# Patient Record
Sex: MALE | HISPANIC OR LATINO | URBAN - METROPOLITAN AREA
[De-identification: names, ages, dates, MRNs, and addresses within clinical notes are randomized per-mention and may not be internally consistent; named-entity substitution may affect disease eponyms.]

---

## 2017-08-01 PROBLEM — F51.05 INSOMNIA DUE TO MENTAL CONDITION: Status: ACTIVE | Noted: 2017-08-01

## 2017-08-01 PROBLEM — F43.21 GRIEF REACTION: Status: ACTIVE | Noted: 2017-08-01

## 2017-09-01 PROBLEM — F43.21 GRIEF REACTION: Status: RESOLVED | Noted: 2017-08-01 | Resolved: 2017-09-01

## 2017-09-01 PROBLEM — F51.05 INSOMNIA DUE TO MENTAL CONDITION: Status: RESOLVED | Noted: 2017-08-01 | Resolved: 2017-09-01

## 2017-10-13 PROBLEM — F43.22 ADJUSTMENT DISORDER WITH ANXIETY: Status: ACTIVE | Noted: 2017-10-13

## 2017-11-10 ENCOUNTER — APPOINTMENT (RX ONLY)
Dept: URBAN - METROPOLITAN AREA CLINIC 349 | Facility: CLINIC | Age: 62
Setting detail: DERMATOLOGY
End: 2017-11-10

## 2017-11-10 DIAGNOSIS — L82.1 OTHER SEBORRHEIC KERATOSIS: ICD-10-CM

## 2017-11-10 DIAGNOSIS — L82.0 INFLAMED SEBORRHEIC KERATOSIS: ICD-10-CM

## 2017-11-10 PROBLEM — F32.9 MAJOR DEPRESSIVE DISORDER, SINGLE EPISODE, UNSPECIFIED: Status: ACTIVE | Noted: 2017-11-10

## 2017-11-10 PROBLEM — K21.9 GASTRO-ESOPHAGEAL REFLUX DISEASE WITHOUT ESOPHAGITIS: Status: ACTIVE | Noted: 2017-11-10

## 2017-11-10 PROBLEM — F41.9 ANXIETY DISORDER, UNSPECIFIED: Status: ACTIVE | Noted: 2017-11-10

## 2017-11-10 PROCEDURE — 17110 DESTRUCTION B9 LES UP TO 14: CPT

## 2017-11-10 PROCEDURE — 99202 OFFICE O/P NEW SF 15 MIN: CPT | Mod: 25

## 2017-11-10 PROCEDURE — ? COUNSELING

## 2017-11-10 PROCEDURE — ? LIQUID NITROGEN

## 2017-11-10 ASSESSMENT — LOCATION DETAILED DESCRIPTION DERM
LOCATION DETAILED: RIGHT CLAVICULAR SKIN
LOCATION DETAILED: RIGHT CENTRAL FRONTAL SCALP
LOCATION DETAILED: RIGHT LATERAL SUPERIOR CHEST
LOCATION DETAILED: RIGHT SUPERIOR FOREHEAD
LOCATION DETAILED: LEFT MID-UPPER BACK
LOCATION DETAILED: LEFT LATERAL SUPERIOR CHEST
LOCATION DETAILED: RIGHT SUPERIOR FOREHEAD

## 2017-11-10 ASSESSMENT — LOCATION SIMPLE DESCRIPTION DERM
LOCATION SIMPLE: RIGHT FOREHEAD
LOCATION SIMPLE: LEFT UPPER BACK
LOCATION SIMPLE: RIGHT SCALP
LOCATION SIMPLE: RIGHT CLAVICULAR SKIN
LOCATION SIMPLE: CHEST
LOCATION SIMPLE: RIGHT FOREHEAD

## 2017-11-10 ASSESSMENT — LOCATION ZONE DERM
LOCATION ZONE: FACE
LOCATION ZONE: TRUNK
LOCATION ZONE: TRUNK
LOCATION ZONE: SCALP
LOCATION ZONE: FACE

## 2017-11-10 NOTE — PROCEDURE: LIQUID NITROGEN
Add 52 Modifier (Optional): no
Consent: The patient's consent was obtained including but not limited to risks of crusting, scabbing, blistering, scarring, darker or lighter pigmentary change, recurrence, incomplete removal and infection.
Number Of Freeze-Thaw Cycles: 1 freeze-thaw cycle
Post-Care Instructions: I reviewed with the patient in detail post-care instructions. Patient is to wear sunprotection, and avoid picking at any of the treated lesions. Pt may apply Vaseline to crusted or scabbing areas.
Medical Necessity Clause: This procedure was medically necessary because the lesions that were treated were:
Include Z78.9 (Other Specified Conditions Influencing Health Status) As An Associated Diagnosis?: Yes
Detail Level: Detailed
Medical Necessity Information: It is in your best interest to select a reason for this procedure from the list below. All of these items fulfill various CMS LCD requirements except the new and changing color options.

## 2017-12-15 PROBLEM — F33.1 MODERATE EPISODE OF RECURRENT MAJOR DEPRESSIVE DISORDER (HCC): Status: ACTIVE | Noted: 2017-12-15

## 2018-08-01 ENCOUNTER — HOSPITAL ENCOUNTER (OUTPATIENT)
Dept: SLEEP MEDICINE | Age: 63
Discharge: HOME | End: 2018-08-01
Payer: MEDICARE

## 2018-08-01 PROCEDURE — 95810 POLYSOM 6/> YRS 4/> PARAM: CPT

## 2018-09-13 PROBLEM — E11.9 CONTROLLED TYPE 2 DIABETES MELLITUS WITHOUT COMPLICATION, WITHOUT LONG-TERM CURRENT USE OF INSULIN (HCC): Status: ACTIVE | Noted: 2018-09-13

## 2018-09-13 PROBLEM — E11.9 CONTROLLED TYPE 2 DIABETES MELLITUS WITHOUT COMPLICATION, WITHOUT LONG-TERM CURRENT USE OF INSULIN (HCC): Status: RESOLVED | Noted: 2018-09-13 | Resolved: 2018-09-13

## 2019-08-05 PROBLEM — R90.89 ABNORMAL FINDING ON MRI OF BRAIN: Status: ACTIVE | Noted: 2019-08-05

## 2019-08-05 PROBLEM — R73.03 PREDIABETES: Status: ACTIVE | Noted: 2019-08-05

## 2019-08-05 PROBLEM — E66.3 OVERWEIGHT: Status: ACTIVE | Noted: 2019-08-05

## 2019-08-05 PROBLEM — Z71.82 EXERCISE COUNSELING: Status: ACTIVE | Noted: 2019-08-05

## 2019-08-05 PROBLEM — Z72.0 TOBACCO USE: Status: ACTIVE | Noted: 2019-08-05

## 2019-08-05 PROBLEM — Z71.3 DIETARY COUNSELING: Status: ACTIVE | Noted: 2019-08-05

## 2019-11-05 PROBLEM — Z23 ENCOUNTER FOR IMMUNIZATION: Status: ACTIVE | Noted: 2019-11-05

## 2019-11-05 PROBLEM — G25.81 RLS (RESTLESS LEGS SYNDROME): Status: ACTIVE | Noted: 2019-11-05

## 2019-11-05 PROBLEM — M19.042 ARTHRITIS OF LEFT HAND: Status: ACTIVE | Noted: 2019-11-05

## 2019-11-05 PROBLEM — Z12.2 ENCOUNTER FOR SCREENING FOR LUNG CANCER: Status: ACTIVE | Noted: 2019-11-05

## 2019-11-05 PROBLEM — J32.9 SINUSITIS: Status: ACTIVE | Noted: 2019-11-05

## 2019-11-05 PROBLEM — M77.8 RIGHT ELBOW TENDINITIS: Status: ACTIVE | Noted: 2019-11-05

## 2019-11-05 PROBLEM — H66.92 LEFT OTITIS MEDIA: Status: ACTIVE | Noted: 2019-11-05

## 2019-11-11 PROBLEM — R10.33 ACUTE PERIUMBILICAL PAIN: Status: ACTIVE | Noted: 2019-11-11

## 2019-11-11 PROBLEM — R19.5 DARK STOOLS: Status: ACTIVE | Noted: 2019-11-11

## 2019-11-11 PROBLEM — R42 DIZZINESS: Status: ACTIVE | Noted: 2019-11-11

## 2019-11-11 PROBLEM — K62.5 RECTAL BLEEDING: Status: ACTIVE | Noted: 2019-11-11

## 2019-11-22 ENCOUNTER — HOSPITAL ENCOUNTER (OUTPATIENT)
Dept: CT IMAGING | Age: 64
Discharge: HOME | End: 2019-11-22
Attending: FAMILY MEDICINE
Payer: MEDICARE

## 2019-11-22 DIAGNOSIS — Z72.0 TOBACCO USE: ICD-10-CM

## 2019-11-22 DIAGNOSIS — E66.3 OVERWEIGHT: ICD-10-CM

## 2019-11-22 DIAGNOSIS — Z12.2 ENCOUNTER FOR SCREENING FOR LUNG CANCER: ICD-10-CM

## 2019-11-22 PROCEDURE — G0297 LDCT FOR LUNG CA SCREEN: HCPCS

## 2019-11-25 NOTE — PROGRESS NOTES
Pt has small lung nodule right upper part of lung  Looks benign- still will refer to PULMONOLOGIST  Usually lesions like these should be monitored yearly for 2 years    Please let his care giver know

## 2019-12-05 PROBLEM — Z23 ENCOUNTER FOR IMMUNIZATION: Status: RESOLVED | Noted: 2019-11-05 | Resolved: 2019-12-05

## 2019-12-05 PROBLEM — Z12.2 ENCOUNTER FOR SCREENING FOR LUNG CANCER: Status: RESOLVED | Noted: 2019-11-05 | Resolved: 2019-12-05

## 2019-12-26 PROBLEM — J44.9 COPD, MODERATE (HCC): Status: ACTIVE | Noted: 2019-12-26

## 2019-12-26 PROBLEM — R06.09 DYSPNEA ON EXERTION: Status: ACTIVE | Noted: 2019-12-26

## 2020-02-11 PROBLEM — Z00.00 MEDICARE ANNUAL WELLNESS VISIT, SUBSEQUENT: Status: ACTIVE | Noted: 2020-02-11

## 2020-03-09 ENCOUNTER — HOSPITAL ENCOUNTER (OUTPATIENT)
Dept: LAB | Age: 65
Discharge: HOME | End: 2020-03-09
Payer: MEDICARE

## 2020-03-09 DIAGNOSIS — R19.5 DARK STOOLS: ICD-10-CM

## 2020-03-09 LAB
ALBUMIN SERPL-MCNC: 3.8 G/DL (ref 3.2–4.6)
ALBUMIN/GLOB SERPL: 1.1 {RATIO} (ref 1.2–3.5)
ALP SERPL-CCNC: 91 U/L (ref 50–136)
ALT SERPL-CCNC: 28 U/L (ref 12–65)
ANION GAP SERPL CALC-SCNC: 6 MMOL/L (ref 7–16)
AST SERPL-CCNC: 17 U/L (ref 15–37)
BASOPHILS # BLD: 0 K/UL (ref 0–0.2)
BASOPHILS NFR BLD: 1 % (ref 0–2)
BILIRUB SERPL-MCNC: 0.8 MG/DL (ref 0.2–1.1)
BUN SERPL-MCNC: 15 MG/DL (ref 8–23)
CALCIUM SERPL-MCNC: 8.9 MG/DL (ref 8.3–10.4)
CHLORIDE SERPL-SCNC: 105 MMOL/L (ref 98–107)
CO2 SERPL-SCNC: 30 MMOL/L (ref 21–32)
CREAT SERPL-MCNC: 0.97 MG/DL (ref 0.8–1.5)
DIFFERENTIAL METHOD BLD: NORMAL
EOSINOPHIL # BLD: 0.2 K/UL (ref 0–0.8)
EOSINOPHIL NFR BLD: 4 % (ref 0.5–7.8)
ERYTHROCYTE [DISTWIDTH] IN BLOOD BY AUTOMATED COUNT: 12.7 % (ref 11.9–14.6)
GLOBULIN SER CALC-MCNC: 3.6 G/DL (ref 2.3–3.5)
GLUCOSE SERPL-MCNC: 126 MG/DL (ref 65–100)
HCT VFR BLD AUTO: 44.2 % (ref 41.1–50.3)
HGB BLD-MCNC: 15.1 G/DL (ref 13.6–17.2)
IMM GRANULOCYTES # BLD AUTO: 0 K/UL (ref 0–0.5)
IMM GRANULOCYTES NFR BLD AUTO: 0 % (ref 0–5)
LYMPHOCYTES # BLD: 1.3 K/UL (ref 0.5–4.6)
LYMPHOCYTES NFR BLD: 26 % (ref 13–44)
MCH RBC QN AUTO: 30.8 PG (ref 26.1–32.9)
MCHC RBC AUTO-ENTMCNC: 34.2 G/DL (ref 31.4–35)
MCV RBC AUTO: 90.2 FL (ref 79.6–97.8)
MONOCYTES # BLD: 0.5 K/UL (ref 0.1–1.3)
MONOCYTES NFR BLD: 9 % (ref 4–12)
NEUTS SEG # BLD: 3 K/UL (ref 1.7–8.2)
NEUTS SEG NFR BLD: 60 % (ref 43–78)
NRBC # BLD: 0 K/UL (ref 0–0.2)
PLATELET # BLD AUTO: 184 K/UL (ref 150–450)
PMV BLD AUTO: 10.1 FL (ref 9.4–12.3)
POTASSIUM SERPL-SCNC: 4 MMOL/L (ref 3.5–5.1)
PROT SERPL-MCNC: 7.4 G/DL (ref 6.3–8.2)
RBC # BLD AUTO: 4.9 M/UL (ref 4.23–5.6)
SODIUM SERPL-SCNC: 141 MMOL/L (ref 136–145)
WBC # BLD AUTO: 5 K/UL (ref 4.3–11.1)

## 2020-03-09 PROCEDURE — 36415 COLL VENOUS BLD VENIPUNCTURE: CPT

## 2020-03-09 PROCEDURE — 85025 COMPLETE CBC W/AUTO DIFF WBC: CPT

## 2020-03-09 PROCEDURE — 80053 COMPREHEN METABOLIC PANEL: CPT

## 2020-10-27 ENCOUNTER — HOSPITAL ENCOUNTER (OUTPATIENT)
Dept: LAB | Age: 65
Discharge: HOME | End: 2020-10-27
Payer: MEDICARE

## 2020-10-27 ENCOUNTER — HOSPITAL ENCOUNTER (OUTPATIENT)
Dept: GENERAL RADIOLOGY | Age: 65
Discharge: HOME | End: 2020-10-27
Payer: MEDICARE

## 2020-10-27 DIAGNOSIS — R05.9 COUGH: ICD-10-CM

## 2020-10-27 DIAGNOSIS — I10 ESSENTIAL HYPERTENSION: ICD-10-CM

## 2020-10-27 DIAGNOSIS — J40 BRONCHITIS: ICD-10-CM

## 2020-10-27 DIAGNOSIS — R73.03 PREDIABETES: ICD-10-CM

## 2020-10-27 DIAGNOSIS — R07.89 CHEST WALL PAIN: ICD-10-CM

## 2020-10-27 LAB
ALBUMIN SERPL-MCNC: 3.8 G/DL (ref 3.2–4.6)
ALBUMIN/GLOB SERPL: 1 {RATIO} (ref 1.2–3.5)
ALP SERPL-CCNC: 88 U/L (ref 50–136)
ALT SERPL-CCNC: 35 U/L (ref 12–65)
ANION GAP SERPL CALC-SCNC: 3 MMOL/L (ref 7–16)
AST SERPL-CCNC: 20 U/L (ref 15–37)
BILIRUB SERPL-MCNC: 0.5 MG/DL (ref 0.2–1.1)
BUN SERPL-MCNC: 17 MG/DL (ref 8–23)
CALCIUM SERPL-MCNC: 9.1 MG/DL (ref 8.3–10.4)
CHLORIDE SERPL-SCNC: 105 MMOL/L (ref 98–107)
CHOLEST SERPL-MCNC: 144 MG/DL
CO2 SERPL-SCNC: 30 MMOL/L (ref 21–32)
CREAT SERPL-MCNC: 0.94 MG/DL (ref 0.8–1.5)
EST. AVERAGE GLUCOSE BLD GHB EST-MCNC: 120 MG/DL
GLOBULIN SER CALC-MCNC: 3.8 G/DL (ref 2.3–3.5)
GLUCOSE SERPL-MCNC: 98 MG/DL (ref 65–100)
HBA1C MFR BLD: 5.8 %
HDLC SERPL-MCNC: 38 MG/DL (ref 40–60)
HDLC SERPL: 3.8 {RATIO}
LDLC SERPL CALC-MCNC: 55.4 MG/DL
LIPID PROFILE,FLP: ABNORMAL
POTASSIUM SERPL-SCNC: 4.2 MMOL/L (ref 3.5–5.1)
PROT SERPL-MCNC: 7.6 G/DL (ref 6.3–8.2)
SODIUM SERPL-SCNC: 138 MMOL/L (ref 136–145)
TRIGL SERPL-MCNC: 253 MG/DL (ref 35–150)
VLDLC SERPL CALC-MCNC: 50.6 MG/DL (ref 6–23)

## 2020-10-27 PROCEDURE — 83036 HEMOGLOBIN GLYCOSYLATED A1C: CPT

## 2020-10-27 PROCEDURE — 71046 X-RAY EXAM CHEST 2 VIEWS: CPT

## 2020-10-27 PROCEDURE — 36415 COLL VENOUS BLD VENIPUNCTURE: CPT

## 2020-10-27 PROCEDURE — 80053 COMPREHEN METABOLIC PANEL: CPT

## 2020-10-27 PROCEDURE — 80061 LIPID PANEL: CPT

## 2020-10-28 NOTE — PROGRESS NOTES
pts chest x-ray normal  pts labs mostly all stable except ELEVATED TRIGLYCERIDES--pt should take 2 gram fish oil daily

## 2021-01-08 ENCOUNTER — HOSPITAL ENCOUNTER (OUTPATIENT)
Dept: CT IMAGING | Age: 66
Discharge: HOME | End: 2021-01-08
Attending: INTERNAL MEDICINE
Payer: MEDICARE

## 2021-01-08 DIAGNOSIS — R91.1 LUNG NODULE: ICD-10-CM

## 2021-01-08 DIAGNOSIS — Z87.891 PERSONAL HISTORY OF TOBACCO USE: ICD-10-CM

## 2021-01-08 PROCEDURE — 71271 CT THORAX LUNG CANCER SCR C-: CPT

## 2021-02-11 PROBLEM — K21.9 GASTROESOPHAGEAL REFLUX DISEASE WITHOUT ESOPHAGITIS: Status: ACTIVE | Noted: 2021-02-11

## 2021-05-06 PROBLEM — Z12.5 SCREENING FOR PROSTATE CANCER: Status: ACTIVE | Noted: 2021-05-06

## 2021-05-06 PROBLEM — N39.0 URINARY TRACT INFECTION WITHOUT HEMATURIA: Status: ACTIVE | Noted: 2021-05-06

## 2021-05-06 PROBLEM — R35.0 FREQUENT URINATION: Status: ACTIVE | Noted: 2021-05-06

## 2021-05-07 ENCOUNTER — HOSPITAL ENCOUNTER (OUTPATIENT)
Dept: LAB | Age: 66
Discharge: HOME | End: 2021-05-07
Attending: FAMILY MEDICINE
Payer: MEDICARE

## 2021-05-07 DIAGNOSIS — F33.0 MILD EPISODE OF RECURRENT MAJOR DEPRESSIVE DISORDER (HCC): ICD-10-CM

## 2021-05-07 DIAGNOSIS — E66.3 OVERWEIGHT: ICD-10-CM

## 2021-05-07 DIAGNOSIS — E78.5 DYSLIPIDEMIA: ICD-10-CM

## 2021-05-07 DIAGNOSIS — R73.03 PREDIABETES: ICD-10-CM

## 2021-05-07 DIAGNOSIS — N39.0 URINARY TRACT INFECTION WITHOUT HEMATURIA, SITE UNSPECIFIED: ICD-10-CM

## 2021-05-07 DIAGNOSIS — K21.9 GASTROESOPHAGEAL REFLUX DISEASE WITHOUT ESOPHAGITIS: ICD-10-CM

## 2021-05-07 DIAGNOSIS — N40.0 BENIGN PROSTATIC HYPERPLASIA WITHOUT LOWER URINARY TRACT SYMPTOMS: ICD-10-CM

## 2021-05-07 DIAGNOSIS — I10 ESSENTIAL HYPERTENSION: ICD-10-CM

## 2021-05-07 DIAGNOSIS — Z71.82 EXERCISE COUNSELING: ICD-10-CM

## 2021-05-07 DIAGNOSIS — R35.0 FREQUENT URINATION: ICD-10-CM

## 2021-05-07 DIAGNOSIS — Z72.0 TOBACCO USE: ICD-10-CM

## 2021-05-07 DIAGNOSIS — Z71.3 DIETARY COUNSELING: ICD-10-CM

## 2021-05-07 LAB
ALBUMIN SERPL-MCNC: 4.1 G/DL (ref 3.2–4.6)
ALBUMIN/GLOB SERPL: 1.2 {RATIO} (ref 1.2–3.5)
ALP SERPL-CCNC: 89 U/L (ref 50–136)
ALT SERPL-CCNC: 42 U/L (ref 12–65)
ANION GAP SERPL CALC-SCNC: 4 MMOL/L (ref 7–16)
AST SERPL-CCNC: 25 U/L (ref 15–37)
BILIRUB SERPL-MCNC: 0.4 MG/DL (ref 0.2–1.1)
BUN SERPL-MCNC: 15 MG/DL (ref 8–23)
CALCIUM SERPL-MCNC: 9.2 MG/DL (ref 8.3–10.4)
CHLORIDE SERPL-SCNC: 104 MMOL/L (ref 98–107)
CHOLEST SERPL-MCNC: 152 MG/DL
CO2 SERPL-SCNC: 30 MMOL/L (ref 21–32)
CREAT SERPL-MCNC: 0.77 MG/DL (ref 0.8–1.5)
EST. AVERAGE GLUCOSE BLD GHB EST-MCNC: 103 MG/DL
GLOBULIN SER CALC-MCNC: 3.4 G/DL (ref 2.3–3.5)
GLUCOSE SERPL-MCNC: 87 MG/DL (ref 65–100)
HBA1C MFR BLD: 5.2 % (ref 4.2–6.3)
HDLC SERPL-MCNC: 40 MG/DL (ref 40–60)
HDLC SERPL: 3.8 {RATIO}
LDLC SERPL CALC-MCNC: 41.8 MG/DL
LIPID PROFILE,FLP: ABNORMAL
POTASSIUM SERPL-SCNC: 4.4 MMOL/L (ref 3.5–5.1)
PROT SERPL-MCNC: 7.5 G/DL (ref 6.3–8.2)
PSA SERPL-MCNC: 2.4 NG/ML
SODIUM SERPL-SCNC: 138 MMOL/L (ref 136–145)
TRIGL SERPL-MCNC: 351 MG/DL (ref 35–150)
VLDLC SERPL CALC-MCNC: 70.2 MG/DL (ref 6–23)

## 2021-05-07 PROCEDURE — 83036 HEMOGLOBIN GLYCOSYLATED A1C: CPT

## 2021-05-07 PROCEDURE — 80061 LIPID PANEL: CPT

## 2021-05-07 PROCEDURE — 80053 COMPREHEN METABOLIC PANEL: CPT

## 2021-05-07 PROCEDURE — 84153 ASSAY OF PSA TOTAL: CPT

## 2021-05-07 PROCEDURE — 36415 COLL VENOUS BLD VENIPUNCTURE: CPT

## 2021-05-13 NOTE — PROGRESS NOTES
Pt has elevated triglycerides--pt to take fish oild 2 gram daily + eat less sugar/carbs and fried /bakery goods  All other labs done were normal

## 2021-06-05 PROBLEM — Z12.5 SCREENING FOR PROSTATE CANCER: Status: RESOLVED | Noted: 2021-05-06 | Resolved: 2021-06-05

## 2021-06-21 ENCOUNTER — HOSPITAL ENCOUNTER (OUTPATIENT)
Dept: GENERAL RADIOLOGY | Age: 66
Discharge: HOME | End: 2021-06-21
Payer: MEDICARE

## 2021-06-21 ENCOUNTER — TRANSCRIBE ORDER (OUTPATIENT)
Dept: SCHEDULING | Age: 66
End: 2021-06-21

## 2021-06-21 ENCOUNTER — HOSPITAL ENCOUNTER (OUTPATIENT)
Dept: CT IMAGING | Age: 66
Discharge: HOME | End: 2021-06-21
Attending: UROLOGY
Payer: MEDICARE

## 2021-06-21 DIAGNOSIS — R31.29 HEMATURIA, MICROSCOPIC: Primary | ICD-10-CM

## 2021-06-21 DIAGNOSIS — R31.9 HEMATURIA: ICD-10-CM

## 2021-06-21 DIAGNOSIS — R31.29 HEMATURIA, MICROSCOPIC: ICD-10-CM

## 2021-06-21 PROCEDURE — 74018 RADEX ABDOMEN 1 VIEW: CPT

## 2021-06-21 PROCEDURE — 74176 CT ABD & PELVIS W/O CONTRAST: CPT

## 2021-08-26 PROBLEM — R85.9: Status: ACTIVE | Noted: 2021-08-26

## 2021-11-23 PROBLEM — E78.1 HYPERTRIGLYCERIDEMIA: Status: ACTIVE | Noted: 2021-11-23

## 2021-11-23 PROBLEM — J30.9 ALLERGIC RHINITIS: Status: ACTIVE | Noted: 2021-11-23

## 2021-12-21 ENCOUNTER — HOSPITAL ENCOUNTER (OUTPATIENT)
Dept: CT IMAGING | Age: 66
Discharge: HOME | End: 2021-12-21
Attending: INTERNAL MEDICINE
Payer: MEDICARE

## 2021-12-21 DIAGNOSIS — R91.1 LUNG NODULE SEEN ON IMAGING STUDY: ICD-10-CM

## 2021-12-21 DIAGNOSIS — J44.9 COPD, MODERATE (HCC): ICD-10-CM

## 2021-12-21 PROCEDURE — 71250 CT THORAX DX C-: CPT

## 2022-03-15 ENCOUNTER — HOSPITAL ENCOUNTER (OUTPATIENT)
Dept: LAB | Age: 67
Discharge: HOME | End: 2022-03-15
Payer: MEDICARE

## 2022-03-15 LAB
ALBUMIN SERPL-MCNC: 3.8 G/DL (ref 3.2–4.6)
ALBUMIN/GLOB SERPL: 1.1 {RATIO} (ref 1.2–3.5)
ALP SERPL-CCNC: 92 U/L (ref 50–136)
ALT SERPL-CCNC: 36 U/L (ref 12–65)
ANION GAP SERPL CALC-SCNC: 2 MMOL/L (ref 7–16)
AST SERPL-CCNC: 22 U/L (ref 15–37)
BILIRUB SERPL-MCNC: 0.6 MG/DL (ref 0.2–1.1)
BUN SERPL-MCNC: 16 MG/DL (ref 8–23)
CALCIUM SERPL-MCNC: 9 MG/DL (ref 8.3–10.4)
CHLORIDE SERPL-SCNC: 105 MMOL/L (ref 98–107)
CHOLEST SERPL-MCNC: 131 MG/DL
CO2 SERPL-SCNC: 32 MMOL/L (ref 21–32)
CREAT SERPL-MCNC: 0.88 MG/DL (ref 0.8–1.5)
EST. AVERAGE GLUCOSE BLD GHB EST-MCNC: 105 MG/DL
GLOBULIN SER CALC-MCNC: 3.6 G/DL (ref 2.3–3.5)
GLUCOSE SERPL-MCNC: 118 MG/DL (ref 65–100)
HBA1C MFR BLD: 5.3 % (ref 4.2–6.3)
HDLC SERPL-MCNC: 42 MG/DL (ref 40–60)
HDLC SERPL: 3.1 {RATIO}
LDLC SERPL CALC-MCNC: 59.4 MG/DL
POTASSIUM SERPL-SCNC: 4.7 MMOL/L (ref 3.5–5.1)
PROT SERPL-MCNC: 7.4 G/DL (ref 6.3–8.2)
SODIUM SERPL-SCNC: 139 MMOL/L (ref 136–145)
TRIGL SERPL-MCNC: 148 MG/DL (ref 35–150)
VLDLC SERPL CALC-MCNC: 29.6 MG/DL (ref 6–23)

## 2022-03-15 PROCEDURE — 80053 COMPREHEN METABOLIC PANEL: CPT

## 2022-03-15 PROCEDURE — 83036 HEMOGLOBIN GLYCOSYLATED A1C: CPT

## 2022-03-15 PROCEDURE — 80061 LIPID PANEL: CPT

## 2022-03-15 PROCEDURE — 36415 COLL VENOUS BLD VENIPUNCTURE: CPT

## 2022-03-18 PROBLEM — J44.9 COPD, MODERATE (HCC): Status: ACTIVE | Noted: 2019-12-26

## 2022-03-18 PROBLEM — J40 BRONCHITIS: Status: ACTIVE | Noted: 2020-10-27

## 2022-03-18 PROBLEM — R19.5 DARK STOOLS: Status: ACTIVE | Noted: 2019-11-11

## 2022-03-18 PROBLEM — R90.89 ABNORMAL FINDING ON MRI OF BRAIN: Status: ACTIVE | Noted: 2019-08-05

## 2022-03-18 PROBLEM — R42 DIZZINESS: Status: ACTIVE | Noted: 2019-11-11

## 2022-03-18 PROBLEM — R73.03 PREDIABETES: Status: ACTIVE | Noted: 2019-08-05

## 2022-03-19 PROBLEM — J32.9 SINUSITIS: Status: ACTIVE | Noted: 2019-11-05

## 2022-03-19 PROBLEM — E66.3 OVERWEIGHT: Status: ACTIVE | Noted: 2019-08-05

## 2022-03-19 PROBLEM — Z71.82 EXERCISE COUNSELING: Status: ACTIVE | Noted: 2019-08-05

## 2022-03-19 PROBLEM — F33.1 MODERATE EPISODE OF RECURRENT MAJOR DEPRESSIVE DISORDER (HCC): Status: ACTIVE | Noted: 2017-12-15

## 2022-03-19 PROBLEM — G25.81 RLS (RESTLESS LEGS SYNDROME): Status: ACTIVE | Noted: 2019-11-05

## 2022-03-19 PROBLEM — R10.33 ACUTE PERIUMBILICAL PAIN: Status: ACTIVE | Noted: 2019-11-11

## 2022-03-19 PROBLEM — F43.22 ADJUSTMENT DISORDER WITH ANXIETY: Status: ACTIVE | Noted: 2017-10-13

## 2022-03-19 PROBLEM — K21.9 GASTROESOPHAGEAL REFLUX DISEASE WITHOUT ESOPHAGITIS: Status: ACTIVE | Noted: 2021-02-11

## 2022-03-19 PROBLEM — R05.9 COUGH: Status: ACTIVE | Noted: 2020-10-27

## 2022-03-19 PROBLEM — M77.8 RIGHT ELBOW TENDINITIS: Status: ACTIVE | Noted: 2019-11-05

## 2022-03-19 PROBLEM — Z00.00 MEDICARE ANNUAL WELLNESS VISIT, SUBSEQUENT: Status: ACTIVE | Noted: 2020-02-11

## 2022-03-19 PROBLEM — Z71.3 DIETARY COUNSELING: Status: ACTIVE | Noted: 2019-08-05

## 2022-03-19 PROBLEM — K62.5 RECTAL BLEEDING: Status: ACTIVE | Noted: 2019-11-11

## 2022-03-19 PROBLEM — Z72.0 TOBACCO USE: Status: ACTIVE | Noted: 2019-08-05

## 2022-03-19 PROBLEM — N39.0 URINARY TRACT INFECTION WITHOUT HEMATURIA: Status: ACTIVE | Noted: 2021-05-06

## 2022-03-19 PROBLEM — M19.042 ARTHRITIS OF LEFT HAND: Status: ACTIVE | Noted: 2019-11-05

## 2022-03-19 PROBLEM — H66.92 LEFT OTITIS MEDIA: Status: ACTIVE | Noted: 2019-11-05

## 2022-03-19 PROBLEM — R35.0 FREQUENT URINATION: Status: ACTIVE | Noted: 2021-05-06

## 2022-03-20 PROBLEM — J30.9 ALLERGIC RHINITIS: Status: ACTIVE | Noted: 2021-11-23

## 2022-03-20 PROBLEM — R85.9: Status: ACTIVE | Noted: 2021-08-26

## 2022-03-20 PROBLEM — Z23 ENCOUNTER FOR IMMUNIZATION: Status: ACTIVE | Noted: 2019-11-05

## 2022-03-20 PROBLEM — R06.09 DYSPNEA ON EXERTION: Status: ACTIVE | Noted: 2019-12-26

## 2022-03-20 PROBLEM — E78.1 HYPERTRIGLYCERIDEMIA: Status: ACTIVE | Noted: 2021-11-23

## 2022-03-20 PROBLEM — R07.89 CHEST WALL PAIN: Status: ACTIVE | Noted: 2020-10-27

## 2022-03-26 PROBLEM — Z00.00 MEDICARE ANNUAL WELLNESS VISIT, SUBSEQUENT: Status: RESOLVED | Noted: 2020-02-11 | Resolved: 2022-03-26

## 2022-06-16 ENCOUNTER — OFFICE VISIT (OUTPATIENT)
Dept: BEHAVIORAL/MENTAL HEALTH CLINIC | Age: 67
End: 2022-06-16
Payer: MEDICARE

## 2022-06-16 ENCOUNTER — OFFICE VISIT (OUTPATIENT)
Dept: PRIMARY CARE CLINIC | Facility: CLINIC | Age: 67
End: 2022-06-16
Payer: MEDICARE

## 2022-06-16 VITALS
WEIGHT: 159 LBS | BODY MASS INDEX: 26.49 KG/M2 | HEART RATE: 60 BPM | OXYGEN SATURATION: 96 % | TEMPERATURE: 98.8 F | DIASTOLIC BLOOD PRESSURE: 68 MMHG | HEIGHT: 65 IN | SYSTOLIC BLOOD PRESSURE: 111 MMHG

## 2022-06-16 VITALS
TEMPERATURE: 98.9 F | HEART RATE: 60 BPM | SYSTOLIC BLOOD PRESSURE: 128 MMHG | WEIGHT: 159.8 LBS | DIASTOLIC BLOOD PRESSURE: 62 MMHG | HEIGHT: 65 IN | BODY MASS INDEX: 26.62 KG/M2 | OXYGEN SATURATION: 97 %

## 2022-06-16 DIAGNOSIS — N40.0 BENIGN PROSTATIC HYPERPLASIA WITHOUT LOWER URINARY TRACT SYMPTOMS: ICD-10-CM

## 2022-06-16 DIAGNOSIS — F33.41 MAJOR DEPRESSIVE DISORDER, RECURRENT, IN PARTIAL REMISSION (HCC): Chronic | ICD-10-CM

## 2022-06-16 DIAGNOSIS — M54.9 CHRONIC BACK PAIN, UNSPECIFIED BACK LOCATION, UNSPECIFIED BACK PAIN LATERALITY: ICD-10-CM

## 2022-06-16 DIAGNOSIS — E78.5 DYSLIPIDEMIA: ICD-10-CM

## 2022-06-16 DIAGNOSIS — J44.9 COPD, MODERATE (HCC): ICD-10-CM

## 2022-06-16 DIAGNOSIS — Z71.3 DIETARY COUNSELING: ICD-10-CM

## 2022-06-16 DIAGNOSIS — Z71.82 EXERCISE COUNSELING: ICD-10-CM

## 2022-06-16 DIAGNOSIS — F51.05 INSOMNIA DUE TO MENTAL DISORDER: ICD-10-CM

## 2022-06-16 DIAGNOSIS — G89.29 CHRONIC BACK PAIN, UNSPECIFIED BACK LOCATION, UNSPECIFIED BACK PAIN LATERALITY: ICD-10-CM

## 2022-06-16 DIAGNOSIS — K21.9 GASTROESOPHAGEAL REFLUX DISEASE WITHOUT ESOPHAGITIS: ICD-10-CM

## 2022-06-16 DIAGNOSIS — E66.3 OVERWEIGHT: ICD-10-CM

## 2022-06-16 DIAGNOSIS — F41.1 GENERALIZED ANXIETY DISORDER: Primary | ICD-10-CM

## 2022-06-16 DIAGNOSIS — F33.42 RECURRENT MAJOR DEPRESSIVE DISORDER, IN FULL REMISSION (HCC): ICD-10-CM

## 2022-06-16 DIAGNOSIS — F17.200 SMOKING: ICD-10-CM

## 2022-06-16 DIAGNOSIS — I20.8 ANGINA AT REST (HCC): ICD-10-CM

## 2022-06-16 DIAGNOSIS — G25.81 RESTLESS LEG SYNDROME: ICD-10-CM

## 2022-06-16 DIAGNOSIS — I10 ESSENTIAL HYPERTENSION: Primary | ICD-10-CM

## 2022-06-16 PROBLEM — I20.89 ANGINA AT REST: Status: ACTIVE | Noted: 2022-06-16

## 2022-06-16 PROCEDURE — G8419 CALC BMI OUT NRM PARAM NOF/U: HCPCS | Performed by: FAMILY MEDICINE

## 2022-06-16 PROCEDURE — 4004F PT TOBACCO SCREEN RCVD TLK: CPT | Performed by: PSYCHIATRY & NEUROLOGY

## 2022-06-16 PROCEDURE — 3017F COLORECTAL CA SCREEN DOC REV: CPT | Performed by: FAMILY MEDICINE

## 2022-06-16 PROCEDURE — 3017F COLORECTAL CA SCREEN DOC REV: CPT | Performed by: PSYCHIATRY & NEUROLOGY

## 2022-06-16 PROCEDURE — 3023F SPIROM DOC REV: CPT | Performed by: FAMILY MEDICINE

## 2022-06-16 PROCEDURE — G8427 DOCREV CUR MEDS BY ELIG CLIN: HCPCS | Performed by: FAMILY MEDICINE

## 2022-06-16 PROCEDURE — G8427 DOCREV CUR MEDS BY ELIG CLIN: HCPCS | Performed by: PSYCHIATRY & NEUROLOGY

## 2022-06-16 PROCEDURE — 4004F PT TOBACCO SCREEN RCVD TLK: CPT | Performed by: FAMILY MEDICINE

## 2022-06-16 PROCEDURE — G8419 CALC BMI OUT NRM PARAM NOF/U: HCPCS | Performed by: PSYCHIATRY & NEUROLOGY

## 2022-06-16 PROCEDURE — 99214 OFFICE O/P EST MOD 30 MIN: CPT | Performed by: FAMILY MEDICINE

## 2022-06-16 PROCEDURE — 99214 OFFICE O/P EST MOD 30 MIN: CPT | Performed by: PSYCHIATRY & NEUROLOGY

## 2022-06-16 RX ORDER — AMOXICILLIN 500 MG
1 CAPSULE ORAL DAILY
COMMUNITY

## 2022-06-16 RX ORDER — TRAZODONE HYDROCHLORIDE 100 MG/1
100 TABLET ORAL NIGHTLY
Qty: 90 TABLET | Refills: 1 | Status: SHIPPED | OUTPATIENT
Start: 2022-06-16 | End: 2022-09-15 | Stop reason: SDUPTHER

## 2022-06-16 RX ORDER — SERTRALINE HYDROCHLORIDE 100 MG/1
100 TABLET, FILM COATED ORAL EVERY MORNING
Qty: 90 TABLET | Refills: 1 | Status: SHIPPED | OUTPATIENT
Start: 2022-06-16 | End: 2022-09-15 | Stop reason: SDUPTHER

## 2022-06-16 RX ORDER — ATENOLOL 25 MG/1
25 TABLET ORAL DAILY
Qty: 90 TABLET | Refills: 0 | Status: SHIPPED | OUTPATIENT
Start: 2022-06-16 | End: 2022-09-15 | Stop reason: SDUPTHER

## 2022-06-16 RX ORDER — BUSPIRONE HYDROCHLORIDE 5 MG/1
5 TABLET ORAL
Qty: 270 TABLET | Refills: 1 | Status: SHIPPED | OUTPATIENT
Start: 2022-06-16 | End: 2022-09-15 | Stop reason: SDUPTHER

## 2022-06-16 RX ORDER — OMEPRAZOLE 40 MG/1
40 CAPSULE, DELAYED RELEASE ORAL DAILY
Qty: 90 CAPSULE | Refills: 0 | Status: SHIPPED | OUTPATIENT
Start: 2022-06-16 | End: 2022-09-15 | Stop reason: SDUPTHER

## 2022-06-16 RX ORDER — ROSUVASTATIN CALCIUM 40 MG/1
40 TABLET, COATED ORAL DAILY
Qty: 90 TABLET | Refills: 0 | Status: SHIPPED | OUTPATIENT
Start: 2022-06-16 | End: 2022-09-15 | Stop reason: SDUPTHER

## 2022-06-16 RX ORDER — TAMSULOSIN HYDROCHLORIDE 0.4 MG/1
0.4 CAPSULE ORAL DAILY
Qty: 90 CAPSULE | Refills: 0 | Status: SHIPPED | OUTPATIENT
Start: 2022-06-16 | End: 2022-09-15 | Stop reason: SDUPTHER

## 2022-06-16 RX ORDER — ROPINIROLE 1 MG/1
1 TABLET, FILM COATED ORAL NIGHTLY
Qty: 90 TABLET | Refills: 0 | Status: SHIPPED | OUTPATIENT
Start: 2022-06-16 | End: 2022-09-15 | Stop reason: SDUPTHER

## 2022-06-16 ASSESSMENT — PATIENT HEALTH QUESTIONNAIRE - PHQ9
SUM OF ALL RESPONSES TO PHQ9 QUESTIONS 1 & 2: 0
SUM OF ALL RESPONSES TO PHQ QUESTIONS 1-9: 2
SUM OF ALL RESPONSES TO PHQ QUESTIONS 1-9: 0
10. IF YOU CHECKED OFF ANY PROBLEMS, HOW DIFFICULT HAVE THESE PROBLEMS MADE IT FOR YOU TO DO YOUR WORK, TAKE CARE OF THINGS AT HOME, OR GET ALONG WITH OTHER PEOPLE: 0
SUM OF ALL RESPONSES TO PHQ QUESTIONS 1-9: 0
SUM OF ALL RESPONSES TO PHQ QUESTIONS 1-9: 0
SUM OF ALL RESPONSES TO PHQ9 QUESTIONS 1 & 2: 0
2. FEELING DOWN, DEPRESSED OR HOPELESS: 0
1. LITTLE INTEREST OR PLEASURE IN DOING THINGS: 0
4. FEELING TIRED OR HAVING LITTLE ENERGY: 1
1. LITTLE INTEREST OR PLEASURE IN DOING THINGS: 0
3. TROUBLE FALLING OR STAYING ASLEEP: 0
5. POOR APPETITE OR OVEREATING: 0
SUM OF ALL RESPONSES TO PHQ QUESTIONS 1-9: 2
SUM OF ALL RESPONSES TO PHQ QUESTIONS 1-9: 2
8. MOVING OR SPEAKING SO SLOWLY THAT OTHER PEOPLE COULD HAVE NOTICED. OR THE OPPOSITE, BEING SO FIGETY OR RESTLESS THAT YOU HAVE BEEN MOVING AROUND A LOT MORE THAN USUAL: 0
SUM OF ALL RESPONSES TO PHQ QUESTIONS 1-9: 0
2. FEELING DOWN, DEPRESSED OR HOPELESS: 0
SUM OF ALL RESPONSES TO PHQ QUESTIONS 1-9: 2
9. THOUGHTS THAT YOU WOULD BE BETTER OFF DEAD, OR OF HURTING YOURSELF: 0
6. FEELING BAD ABOUT YOURSELF - OR THAT YOU ARE A FAILURE OR HAVE LET YOURSELF OR YOUR FAMILY DOWN: 0
7. TROUBLE CONCENTRATING ON THINGS, SUCH AS READING THE NEWSPAPER OR WATCHING TELEVISION: 1

## 2022-06-16 ASSESSMENT — ANXIETY QUESTIONNAIRES
7. FEELING AFRAID AS IF SOMETHING AWFUL MIGHT HAPPEN: 0
6. BECOMING EASILY ANNOYED OR IRRITABLE: 0
1. FEELING NERVOUS, ANXIOUS, OR ON EDGE: 1
GAD7 TOTAL SCORE: 2
5. BEING SO RESTLESS THAT IT IS HARD TO SIT STILL: 0
3. WORRYING TOO MUCH ABOUT DIFFERENT THINGS: 1
IF YOU CHECKED OFF ANY PROBLEMS ON THIS QUESTIONNAIRE, HOW DIFFICULT HAVE THESE PROBLEMS MADE IT FOR YOU TO DO YOUR WORK, TAKE CARE OF THINGS AT HOME, OR GET ALONG WITH OTHER PEOPLE: NOT DIFFICULT AT ALL
4. TROUBLE RELAXING: 0
2. NOT BEING ABLE TO STOP OR CONTROL WORRYING: 0

## 2022-06-16 ASSESSMENT — ENCOUNTER SYMPTOMS
GASTROINTESTINAL NEGATIVE: 1
COUGH: 1
SHORTNESS OF BREATH: 1
ALLERGIC/IMMUNOLOGIC NEGATIVE: 1
EYES NEGATIVE: 1

## 2022-06-16 NOTE — PROGRESS NOTES
Patient:  Lisy Miles  Age:  61 y.o.  :  1955     SEX:  male MRN:  [id number]     RACE: [unfilled]     SEEN:  [x]  PATIENT  []  SPOUSE []  OTHER:              PHQ-9  2022   Little interest or pleasure in doing things 0 0 -   Little interest or pleasure in doing things - - 0   Feeling down, depressed, or hopeless 0 0 -   Trouble falling or staying asleep, or sleeping too much 0 - -   Trouble falling or staying asleep, or sleeping too much - - -   Feeling tired or having little energy 1 - -   Feeling tired or having little energy - - -   Poor appetite or overeating 0 - -   Poor appetite, weight loss, or overeating - - -   Feeling bad about yourself - or that you are a failure or have let yourself or your family down 0 - -   Feeling bad about yourself - or that you are a failure or have let yourself or your family down - - -   Trouble concentrating on things, such as reading the newspaper or watching television 1 - -   Trouble concentrating on things such as school, work, reading, or watching TV - - -   Moving or speaking so slowly that other people could have noticed. Or the opposite - being so fidgety or restless that you have been moving around a lot more than usual 0 - -   Moving or speaking so slowly that other people could have noticed; or the opposite being so fidgety that others notice - - -   Thoughts that you would be better off dead, or of hurting yourself in some way 0 - -   Thoughts of being better off dead, or hurting yourself in some way - - -   PHQ-2 Score 0 0 -   Total Score PHQ 2 - - 0   PHQ-9 Total Score 2 0 -   PHQ 9 Score - - -   If you checked off any problems, how difficult have these problems made it for you to do your work, take care of things at home, or get along with other people?  0 - -   How difficult have these problems made it for you to do your work, take care of your home and get along with others - - -       VIDA-7 SCREENING 2022 Feeling nervous, anxious, or on edge Several days -   Not being able to stop or control worrying Not at all -   Worrying too much about different things Several days -   Trouble relaxing Not at all -   Being so restless that it is hard to sit still Not at all -   Becoming easily annoyed or irritable Not at all -   Feeling afraid as if something awful might happen Not at all -   VIDA-7 Total Score 2 -   How difficult have these problems made it for you to do your work, take care of things at home, or get along with other people? Not difficult at all Not difficult at all   Feeling nervous, anxious, or on edge - Not at all   VIDA-7 Total Score - 1         Chief complaint:  Pt says he has been doing okay on the current medications. Subjective:  Seen in person with his friend and care provider Mr. Dina Long. States has been a doing well. Care provider expresses concern over his walking and heat. He also wants him to quit smoking. Patient smokes 2 to 3 cigarettes daily. Patient states that he still anxious. Provider states that he has eating. Patient states he does not have any friends. He has been depressed and has been stable. He provided reports patient has been using nightly metal detector to find a panic and he has accumulated enough money to be able to buy a storage space in his backyard. Denies side effects from the medications. Supportive psychotherapy provided. Denies suicidal ideation/homicidal ideations. Denies symptoms psychosis. COVID-19 precautions  discussed with him. Reassurance provided.          Patient Active Problem List   Diagnosis    GERD (gastroesophageal reflux disease)    Dark stools    Dizziness    Abnormal finding on MRI of brain    Prediabetes    COPD, moderate (HCC)    Bronchitis    BMI 28.0-28.9,adult    Mixed simple and mucopurulent chronic bronchitis ([facility]    Essential hypertension    Adjustment disorder with anxiety    Exercise counseling    Cough    Chronic back pain    Benign prostatic hyperplasia without lower urinary tract symptoms    Smoking    Dyslipidemia    Arthritis of left hand    Right elbow tendinitis    Sinusitis    Rectal bleeding    Moderate episode of recurrent major depressive disorder (HCC)    Overweight    Tobacco use    Gastroesophageal reflux disease without esophagitis    Chest pain    Restless leg syndrome    Acute periumbilical pain    Lung nodule < 6cm on CT    Frequent urination    Depression    Urinary tract infection without hematuria    Left otitis media    Dietary counseling    Encounter for immunization    Hypertriglyceridemia    Generalized anxiety disorder    Chronic leg pain    Tobacco dependence    Allergic rhinitis    Dyspnea on exertion    Nonspecific abnormal finding in saliva    Chest wall pain       Denies palpitation,SOB, Chest pain, headaches. In no acute distress.        MEDICATION REVIEW:    Current Medications:    Current Outpatient Medications   Medication Sig    Multiple Vitamin (MULTIVITAMINS PO) Take 1 tablet by mouth daily    Omega-3 Fatty Acids (FISH OIL) 1200 MG CAPS Take 1 capsule by mouth daily    atenolol (TENORMIN) 25 MG tablet Take 1 tablet by mouth daily    tamsulosin (FLOMAX) 0.4 MG capsule Take 1 capsule by mouth daily    rosuvastatin (CRESTOR) 40 MG tablet Take 1 tablet by mouth daily    rOPINIRole (REQUIP) 1 MG tablet Take 1 tablet by mouth nightly    omeprazole (PRILOSEC) 40 MG delayed release capsule Take 1 capsule by mouth daily    sertraline (ZOLOFT) 100 MG tablet Take 1 tablet by mouth every morning    traZODone (DESYREL) 100 MG tablet Take 1 tablet by mouth nightly    busPIRone (BUSPAR) 5 MG tablet Take 1 tablet by mouth 3 times daily (with meals)    ascorbic acid (VITAMIN C) 250 MG tablet Take 250 mg by mouth daily     aspirin 81 MG EC tablet Take 81 mg by mouth daily    ipratropium-albuterol (DUONEB) 0.5-2.5 (3) MG/3ML SOLN nebulizer solution Inhale 3 mLs into the lungs 3 times daily    nitroGLYCERIN (NITROSTAT) 0.4 MG SL tablet Place 0.4 mg under the tongue every 5 minutes as needed      No current facility-administered medications for this visit. No Known Allergies    Past Medical History, Past Surgical History, Family history, Social History, and Medications were all reviewed with the patient today and updated as necessary.      Compliant with medication: Yes   Side effects from medications:  No     EXAMINATION  Musculoskeletal    GAIT AND STATION   [x] WNL   [] RESTRICTED   [] UNSTEADY WALK        [] ABNORMAL   [] UNBALANCED         PSYCHIATRIC     GENERAL APPEARANCE:   [x]  WELL GROOMED []     DISHEVELED   []  UNKEMPT      []  UNUSUAL/BIZZARE    [] WNL       ATTITUDE:   [x] COOPERATIVE   [] GUARDED   [] SUSPICIOUS      [] HOSTILE                            BEHAVIOR:   [x] CALM   [] HYPERACTIVE   [] MANNERISMS      [] BIZZARE         SPEECH:   [x] NORMAL FOR CLIENT   [] SPONTANEOUS   [] SLURRED   [] WHISPERING      [] LOUD   [] PRESSURED   [] ARTICULATE       EYE CONTACT:   [x] WNL   [] BLANK STARE   [] INTENSE      [] AVOIDANT         MOOD:   [] EUTHYMIC   [x] ANXIOUS   [] DEPRESSED      [] IRRITABLE   [] ANGRY   [] APATHETIC     AFFECT:   [x] CONGRUENT WITH MOOD   [] FLAT   [] CONSTRICTED      [] INAPPROPRIATE   [] LABILE           THOUGHT PROCESS:   [x] LOGICAL/GOAL-DIRECTED   [] FOI   [] CIRCUMSANTIAL      [] INCOHERENT   [] TANGENTIAL   [] CONCRETE      [] PERSEVERATION           THOUGHT CONTENT:                DELUSIONS  [x] DENIES  [] GRANDIOSE  [] PERSECUTORY  [] Protestant  [] REFERENCE   HALLUCINATIONS  [x] DENIES  [] AUDITORY  [] VISUAL  [] OLFACTORY  [] TACTILE     [] GUSTATORY  [] SOMATIC         OBSESSIONS  [x] DENIES  [] PRESENT         SUICIDAL IDEATION  [x] DENIES  [] PRESENT W/O PLAN  [] PRESENT W/ PLAN       HOMICIDAL IDEATION  [x] DENIES  [] PRESENT W/O PLAN  [] PRESENT W/ PLAN           JUDGEMENT:   [x] GOOD   [] FAIR   [] POOR   INSIGHT:   [x] GOOD   [] FAIR   [] POOR     COGNITION:           SENSORIUM:   [x] ALERT   [] CLOUDED   [] DROWSY     ORIENTATION:   [x] INTACT   [] TIME:  PLACE  PERSON   RECENT & REMOTE MEMORY:   [] NORMAL   [x] OTHER:                  ATTENTION:   [x] INTACT   [] MILD IMPAIRMENT   [] SEVERE IMPAIRMENT     CONCENTRATION:   [x] INTACT   [] MILD IMPAIRMENT   [] SEVERE IMPAIRMENT     LANGUAGE:   [x] AVERAGE   [] ABOVE AVERAGE   [] BELOW AVERAGE     FUND OF KNOWLEDGE:   [] UNABLE TO ASSESS AT THIS TIME   [x] AVERAGE   [] ABOVE AVERAGE   [] BELOW AVERAGE      [] GOOD TO EXCELLENT KNOWLEDGE OF CURRENT EVENTS   [] POOR TO NO KNLEDGE OF CURRENT EVENTS           ABNORMAL MOVEMENTS:   [x] NONE   [] TICS   [] TREMORS   [] BIZZARE      [] FACE   [] TRUNK   [] EXTREMETIES   [] GESTURES        SLEEP:   [x] GOOD   [] FAIR   [] POOR      MUSCLE STRENGTH AND TONE   [x] WNL   [] ATROPHY   [] SPASTIC        [] FLACCID   [] COGWHEEL         Diagnoses/Impressions:    ICD-10-CM    1. Generalized anxiety disorder  F41.1    2. Recurrent major depressive disorder, in full [facility] ([facility]  F33.42    3.  Insomnia due to mental disorder  F51.05        TREATMENT GOALS:  Symptom reduction, Medication adherence, maintain therapeutic gains    LABS/IMAGING:    []  Ordered [x]  Reviewed []  New Labs Ordered:     LAB  WBC   Date/Time Value Ref Range Status   03/09/2020 09:48 AM 5.0 4.3 - 11.1 K/uL Final     Hemoglobin   Date/Time Value Ref Range Status   03/09/2020 09:48 AM 15.1 13.6 - 17.2 g/dL Final     Hematocrit   Date/Time Value Ref Range Status   03/09/2020 09:48 AM 44.2 41.1 - 50.3 % Final     Platelets   Date/Time Value Ref Range Status   03/09/2020 09:48  150 - 450 K/uL Final     Sodium   Date/Time Value Ref Range Status   03/15/2022 12:02  136 - 145 mmol/L Final     Potassium   Date/Time Value Ref Range Status   03/15/2022 12:02 PM 4.7 3.5 - 5.1 mmol/L Final     Chloride   Date/Time Value Ref Range Status   03/15/2022 12:02  98 - 107 mmol/L Final     CO2   Date/Time Value Ref Range Status   03/15/2022 12:02 PM 32 21 - 32 mmol/L Final     BUN   Date/Time Value Ref Range Status   03/15/2022 12:02 PM 16 8 - 23 MG/DL Final     ALT   Date/Time Value Ref Range Status   03/15/2022 12:02 PM 36 12 - 65 U/L Final     AST   Date/Time Value Ref Range Status   03/15/2022 12:02 PM 22 15 - 37 U/L Final       Please refer to the lab tab in the epic and care everywhere for the most recent lab results. Plan:     [x]  Medication ordered: Zoloft, trazodone, BuSpar to target depression,    [x]  Medication education/counseling provided  Medication dosage and time to take, purpose/expected benefits/risks, common side effects, lab monitoring required and reason, expected length of treatment, risk of no treatment, effects on pregnancy/nursing, financial availability. Educated patient on  side effects/risks/benefits of meds including cardiac arrhythmias, suicidal ideations, priapism, orthostatic hypotension, serotonin syndrome, risk of blake/hypomania from antidepressants, withdrawals from abrupt discontinuation of meds, risk of bleeding, risk of seizures,  high blood pressure, dizziness, drowsiness, sedation , risk of falls, Risk & benefits discussed: including but not limited to possible off-label medication usage. [x] Follow MSE for sxs improvement     I have reviewed the patients controlled substance prescription history, as maintained in the [facility] prescription monitoring program, so that the prescription(s) for a  controlled substance can be given. Recommendations and Referrals: Follow up with : MD, requires monitoring of response to medication, requires monitoring of medication side effects.     Time until next PMA:     Follow up with Mental Health Clinicians: psychotherapy interventions, improve level of functioning, monitoring to prevent decompensation /hospitalization, monitoring to maintain therapeutic gains, symptoms (resolving and controlled)           Psychotherapy note:                                __15_ Minutes of psychotherapy     [x]  Supportive psychotherapy, Patient discussed certain situational and personal stressors ongoing in his life at this time, weight management d/w the patient. Sleep hygiene d/w patient. Patient allowed to vent out his emotions. Scenarios were reviewed using role playing and CBT techniques in order to increase insight and decrease anxiety. []  Disposition planning      []  Dangerous and will not contract for safety in the community    **Pateint has been notified: They are to call 911 or go to their nearest E.R. if they are experiencing a medical emergency or suicidal ideations/homicidal ideations. **  All ancillary documentation entered reviewed by provider. PLEASE NOTE:  This document has been produced in part or whole using voice recognition software. Proofread however unrecognized errors in transcription may be present.         Irene Bryson MD

## 2022-06-16 NOTE — PROGRESS NOTES
[address] [facility], [facility] [address]  Office : [phone]  Fax : [phone]      Subjective: The patient is a 61 y.o. male  who presents for f/u on multiple chronic medical conditions-good compliance with medications--pt here to get routeine labs and need refill on meds. no cardiopulmonary symptoms  Pt lives with his long standing family friend who is care giver for this pt  pts family lives outside [place]  Pt accompanied by his friend   Hypertension--Pt BP been controlled with meds  Prediabetes -pts blood sugar stable on med  Hyperlipidemia--pts on low carb diet and low fat diet -stable on med  Gerd -stable on diet /med  COPD--Pt seeing pulmonologist regularly  ANXIETY/DEPRESSION -WELL CONTROLLED ON MED--sees psychiatrist  Pt seen GI for GI bleed--all work up was negative-has gastritis-on PPI  Allergic rhinitis-likely from smoking  And environmental  Hx of ANGINA--on and off symptoms-had stress test in 2016-was normal--lately last 3 months more symptoms-uses nitro as needed      Patient Active Problem List   Diagnosis    GERD (gastroesophageal reflux disease)    Dark stools    Dizziness    Abnormal finding on MRI of brain    Prediabetes    COPD, moderate ([address]    Bronchitis    BMI 28.0-28.9,adult    Mixed simple and mucopurulent chronic bronchitis ([address]    Essential hypertension    Adjustment disorder with anxiety    Exercise counseling    Cough    Chronic back pain    Benign prostatic hyperplasia without lower urinary tract symptoms    Smoking    Dyslipidemia    Arthritis of left hand    Right elbow tendinitis    Sinusitis    Rectal bleeding    Moderate episode of recurrent major depressive disorder (HCC)    Overweight    Tobacco use    Gastroesophageal reflux disease without esophagitis    Chest pain    Restless leg syndrome    Acute periumbilical pain    Lung nodule < 6cm on CT    Frequent urination    Depression    Urinary tract infection without hematuria    Left otitis media    Dietary counseling    Encounter for immunization    Hypertriglyceridemia    Generalized anxiety disorder    Chronic leg pain    Tobacco dependence    Allergic rhinitis    Dyspnea on exertion    Nonspecific abnormal finding in saliva    Chest wall pain    Angina at rest [facility])    Major depressive disorder, recurrent, in partial remission ([facility] [address]       Past Medical History:   Diagnosis Date    Angina pectoris ([address] 11/17/2016    Anxiety 11/17/2016    Arm pain 11/17/2016    Asthma     controlled with prn medication. Last exacerbation March 2011    BPH (benign prostatic hyperplasia) 11/17/2016    Chest pain 11/17/2016    -daily  OV: 4/25/13 He continues to have substernal pressure with ex., relieved with rest. Stress test was non-dx.  Cholelithiasis without cholecystitis 11/04/2010    Chronic airway obstruction, not elsewhere classified 9/21/2010    Chronic back pain 11/17/2016    Chronic leg pain 11/17/2016    Depression     Dyslipidemia 11/17/2016    Atherogenic Dyslipidemia/Hyperlipidemia    Generalized anxiety disorder 11/17/2016    GERD (gastroesophageal reflux disease)     controlled with med.  History of kidney stones     HTN (hypertension) 11/17/2016    Hypercholesterolemia     controlled with medication    Hypertension     controlled with meds.     Ill-defined condition     Break Out on Lt Leg    Insomnia secondary to anxiety 11/17/2016    Major depressive disorder, recurrent ([address] 11/17/2016    Petechiae 11/17/2016    Rectal bleeding 11/17/2016    SOB (shortness of breath) 12/2019    Tobacco dependence 11/17/2016    Tobacco use disorder 11/17/2016       Past Surgical History:   Procedure Laterality Date    CHOLECYSTECTOMY  2011    HERNIA REPAIR  age 12 yrs   Dorena Whitney HERNIA REPAIR Bilateral     HERNIA REPAIR  age 17 yrs    LITHOTRIPSY  late 1980's       Social History     Socioeconomic History    Marital status: Single     Spouse 96%   BMI 26.46 kg/m²     Physical Exam  Constitutional:       Appearance: Normal appearance. HENT:      Head: Normocephalic and atraumatic. Right Ear: External ear normal.      Left Ear: External ear normal.      Nose: Nose normal.      Mouth/Throat:      Mouth: Mucous membranes are moist.      Pharynx: Oropharynx is clear. Eyes:      Extraocular Movements: Extraocular movements intact. Conjunctiva/sclera: Conjunctivae normal.      Pupils: Pupils are equal, round, and reactive to light. Cardiovascular:      Rate and Rhythm: Normal rate and regular rhythm. Pulmonary:      Effort: Pulmonary effort is normal.      Breath sounds: Normal breath sounds. Abdominal:      General: Bowel sounds are normal.      Palpations: Abdomen is soft. Musculoskeletal:         General: Normal range of motion. Cervical back: Normal range of motion and neck supple. Skin:     General: Skin is warm. Neurological:      General: No focal deficit present. Mental Status: He is alert and oriented to person, place, and time. Psychiatric:         Mood and Affect: Mood normal.         Behavior: Behavior normal.         Thought Content: Thought content normal.         Judgment: Judgment normal.            ASSESSMENT/PLAN:    1. Essential hypertension  Overview:  Stable with med  Stress test 2016=normal  Refilled  Labs   Annual eye exam recommended  F/u in 3 months      Orders:  -     atenolol (TENORMIN) 25 MG tablet; Take 1 tablet by mouth daily, Disp-90 tablet, R-0Normal  -     [address]  2. Dyslipidemia  Overview: On statin      Orders:  -     rosuvastatin (CRESTOR) 40 MG tablet; Take 1 tablet by mouth daily, Disp-90 tablet, R-0Normal  -     [address]  3. Major depressive disorder, recurrent, in partial remission [facility])  Comments:  dr Maxwell Alvarado  stable on meds  Overview:  dr Maxwell Alvarado  stable on meds    4.  Gastroesophageal reflux disease without tablet     Refill:  0    omeprazole (PRILOSEC) 40 MG delayed release capsule     Sig: Take 1 capsule by mouth daily     Dispense:  90 capsule     Refill:  0          No results found for any visits on 06/16/22. Lab Results   Component Value Date     03/15/2022    K 4.7 03/15/2022     03/15/2022    CO2 32 03/15/2022    BUN 16 03/15/2022    CREATININE 0.88 03/15/2022    GLUCOSE 118 (H) 03/15/2022    CALCIUM 9.0 03/15/2022    PROT 7.4 03/15/2022    LABALBU 3.8 03/15/2022    BILITOT 0.6 03/15/2022    ALKPHOS 92 03/15/2022    AST 22 03/15/2022    ALT 36 03/15/2022    GFRAA >60 03/15/2022    AGRATIO 1.1 (L) 03/15/2022    GLOB 3.6 (H) 03/15/2022     Lab Results   Component Value Date    WBC 5.0 03/09/2020    HGB 15.1 03/09/2020    HCT 44.2 03/09/2020    MCV 90.2 03/09/2020     03/09/2020     Lab Results   Component Value Date    LABA1C 5.3 03/15/2022     Lab Results   Component Value Date     03/15/2022     Lab Results   Component Value Date    CHOL 131 03/15/2022    CHOL 152 05/07/2021    CHOL 144 10/27/2020     Lab Results   Component Value Date    TRIG 148 03/15/2022    TRIG 351 (H) 05/07/2021    TRIG 253 (H) 10/27/2020     Lab Results   Component Value Date    HDL 42 03/15/2022    HDL 40 05/07/2021    HDL 38 (L) 10/27/2020     Lab Results   Component Value Date    LDLCALC 59.4 03/15/2022    LDLCALC 41.8 05/07/2021    LDLCALC 55.4 10/27/2020     Lab Results   Component Value Date    LABVLDL 29.6 (H) 03/15/2022    LABVLDL 70.2 (H) 05/07/2021    LABVLDL 50.6 (H) 10/27/2020     Lab Results   Component Value Date    CHOLHDLRATIO 3.1 03/15/2022    CHOLHDLRATIO 3.8 05/07/2021    CHOLHDLRATIO 3.8 10/27/2020           We discussed the expected course, resolution and complications of the diagnosis(es) in detail. Medication risks, benefits, costs, interactions, and alternatives were discussed as indicated.   I advised her to contact the office if her condition worsens, changes or fails to improve as anticipated. She expressed understanding with the diagnosis(es) and plan. Return in about 3 months (around 9/16/2022).      Gabriela Kaye MD

## 2022-07-19 NOTE — PROGRESS NOTES
[facility] CARDIOLOGY History & Physical                 Reason for Visit: Chest pain    Subjective:     Patient is a 61 y.o. male with a PMH of hypertension and hyperlipidemia who presents as a referral for chest pain. The patient reports having chest pain \"when I walk a lot\". He has had chest pain for the last few years. He denies hemoptysis but reports LAM. The patient reports that he has seen [facility] in the past and was prescribed nitroglycerin as needed. He says the nitroglycerin does help his chest pain. Furthermore, he reports having a history of a negative stress test    Past Medical History:   Diagnosis Date    Angina pectoris ([facility] [address] 11/17/2016    Anxiety 11/17/2016    Arm pain 11/17/2016    Asthma     controlled with prn medication. Last exacerbation March 2011    BPH (benign prostatic hyperplasia) 11/17/2016    Chest pain 11/17/2016    -daily  OV: 4/25/13 He continues to have substernal pressure with ex., relieved with rest. Stress test was non-dx. Cholelithiasis without cholecystitis 11/04/2010    Chronic airway obstruction, not elsewhere classified 9/21/2010    Chronic back pain 11/17/2016    Chronic leg pain 11/17/2016    Depression     Dyslipidemia 11/17/2016    Atherogenic Dyslipidemia/Hyperlipidemia    Generalized anxiety disorder 11/17/2016    GERD (gastroesophageal reflux disease)     controlled with med. History of kidney stones     HTN (hypertension) 11/17/2016    Hypercholesterolemia     controlled with medication    Hypertension     controlled with meds.     Ill-defined condition     Break Out on Lt Leg    Insomnia secondary to anxiety 11/17/2016    Major depressive disorder, recurrent ([facility] [address] 11/17/2016    Petechiae 11/17/2016    Rectal bleeding 11/17/2016    SOB (shortness of breath) 12/2019    Tobacco dependence 11/17/2016    Tobacco use disorder 11/17/2016      Past Surgical History:   Procedure Laterality Date    CHOLECYSTECTOMY  2011    HERNIA REPAIR  age 12 yrs HERNIA REPAIR Bilateral     HERNIA REPAIR  age 17 yrs    LITHOTRIPSY  late 18's      Family History   Problem Relation Age of Onset    Heart Disease Sister     Lung Disease Mother     Diabetes Brother     Heart Disease Brother     Heart Disease Sister     Diabetes Sister     Diabetes Father     Hypertension Father     Heart Disease Father     Diabetes Brother     Heart Disease Brother     Stroke Paternal Grandfather     Heart Attack Paternal Grandfather     Coronary Art Dis Other         Family Hx of CAD    Heart Failure Other         A sister with CHF    Stroke Other         A sister with stroke    Elevated Lipids Other         Family Hx of Hyperlipidemia    Heart Disease Mother     Diabetes Mother     Diabetes Sister       Social History     Tobacco Use    Smoking status: Every Day     Packs/day: 0.25     Years: 46.00     Pack years: 11.50     Types: Cigarettes    Smokeless tobacco: Never    Tobacco comments:     Quit smokin-2 cig per day   Substance Use Topics    Alcohol use: No      No Known Allergies      ROS:  No obvious pertinent positives on review of systems except for what was outlined above.        Objective:       /60   Pulse 56   Ht 5' 5\" (1.651 m)   Wt 159 lb (72.1 kg)   BMI 26.46 kg/m²     BP Readings from Last 3 Encounters:   22 110/60   22 128/62   22 111/68       Wt Readings from Last 3 Encounters:   22 159 lb (72.1 kg)   22 159 lb 12.8 oz (72.5 kg)   22 159 lb (72.1 kg)       General/Constitutional:   Alert and oriented x 3, no acute distress  HEENT:   normocephalic, atraumatic, moist mucous membranes  Neck:   No JVD or carotid bruits bilaterally  Cardiovascular:   regular rate and rhythm, no rub/gallop appreciated  Pulmonary:   clear to auscultation bilaterally, no respiratory distress  Abdomen:   soft, non-tender, non-distended  Ext:   No sig LE edema bilaterally  Skin:  warm and dry, no obvious rashes seen  Neuro:   no obvious sensory or motor deficits  Psychiatric:   normal mood and affect      ECG:   Sinus bradycardia  No ST and/or T wave abnormalities  Heart rate 56 bpm    Data Review:   Lab Results   Component Value Date    CHOL 131 03/15/2022    CHOL 152 05/07/2021    CHOL 144 10/27/2020     Lab Results   Component Value Date    TRIG 148 03/15/2022    TRIG 351 (H) 05/07/2021    TRIG 253 (H) 10/27/2020     Lab Results   Component Value Date    HDL 42 03/15/2022    HDL 40 05/07/2021    HDL 38 (L) 10/27/2020     Lab Results   Component Value Date    LDLCALC 59.4 03/15/2022    LDLCALC 41.8 05/07/2021    LDLCALC 55.4 10/27/2020     Lab Results   Component Value Date    LABVLDL 29.6 (H) 03/15/2022    LABVLDL 70.2 (H) 05/07/2021    LABVLDL 50.6 (H) 10/27/2020     Lab Results   Component Value Date    CHOLHDLRATIO 3.1 03/15/2022    CHOLHDLRATIO 3.8 05/07/2021    CHOLHDLRATIO 3.8 10/27/2020        Lab Results   Component Value Date/Time     03/15/2022 12:02 PM     05/07/2021 11:14 AM     10/27/2020 12:00 PM    K 4.7 03/15/2022 12:02 PM    K 4.4 05/07/2021 11:14 AM    K 4.2 10/27/2020 12:00 PM     03/15/2022 12:02 PM     05/07/2021 11:14 AM     10/27/2020 12:00 PM    CO2 32 03/15/2022 12:02 PM    CO2 30 05/07/2021 11:14 AM    CO2 30 10/27/2020 12:00 PM    BUN 16 03/15/2022 12:02 PM    BUN 15 05/07/2021 11:14 AM    BUN 17 10/27/2020 12:00 PM    CREATININE 0.88 03/15/2022 12:02 PM    CREATININE 0.77 05/07/2021 11:14 AM    CREATININE 0.94 10/27/2020 12:00 PM    GLUCOSE 118 03/15/2022 12:02 PM    GLUCOSE 87 05/07/2021 11:14 AM    GLUCOSE 98 10/27/2020 12:00 PM    CALCIUM 9.0 03/15/2022 12:02 PM    CALCIUM 9.2 05/07/2021 11:14 AM    CALCIUM 9.1 10/27/2020 12:00 PM         Lab Results   Component Value Date    ALT 36 03/15/2022    ALT 42 05/07/2021    ALT 35 10/27/2020    AST 22 03/15/2022    AST 25 05/07/2021    AST 20 10/27/2020        Assessment/Plan:   1.  Hypertension, unspecified type  - Well-controlled  - Continue with atenolol    2. Chest pain, unspecified type  - Obtain a CCTA  - PE unlikely per PE Wells score    3.  Hyperlipidemia, unspecified hyperlipidemia type  - Continue with Crestor    F/U: After Mich Winchester MD

## 2022-07-20 ENCOUNTER — INITIAL CONSULT (OUTPATIENT)
Dept: CARDIOLOGY CLINIC | Age: 67
End: 2022-07-20
Payer: MEDICARE

## 2022-07-20 VITALS
DIASTOLIC BLOOD PRESSURE: 60 MMHG | WEIGHT: 159 LBS | BODY MASS INDEX: 26.49 KG/M2 | HEART RATE: 56 BPM | HEIGHT: 65 IN | SYSTOLIC BLOOD PRESSURE: 110 MMHG

## 2022-07-20 DIAGNOSIS — I10 HYPERTENSION, UNSPECIFIED TYPE: Primary | ICD-10-CM

## 2022-07-20 DIAGNOSIS — R07.9 CHEST PAIN, UNSPECIFIED TYPE: ICD-10-CM

## 2022-07-20 DIAGNOSIS — E78.5 HYPERLIPIDEMIA, UNSPECIFIED HYPERLIPIDEMIA TYPE: ICD-10-CM

## 2022-07-20 PROCEDURE — G8427 DOCREV CUR MEDS BY ELIG CLIN: HCPCS | Performed by: INTERNAL MEDICINE

## 2022-07-20 PROCEDURE — 3017F COLORECTAL CA SCREEN DOC REV: CPT | Performed by: INTERNAL MEDICINE

## 2022-07-20 PROCEDURE — G8419 CALC BMI OUT NRM PARAM NOF/U: HCPCS | Performed by: INTERNAL MEDICINE

## 2022-07-20 PROCEDURE — 99214 OFFICE O/P EST MOD 30 MIN: CPT | Performed by: INTERNAL MEDICINE

## 2022-07-20 PROCEDURE — 4004F PT TOBACCO SCREEN RCVD TLK: CPT | Performed by: INTERNAL MEDICINE

## 2022-07-20 PROCEDURE — 93000 ELECTROCARDIOGRAM COMPLETE: CPT | Performed by: INTERNAL MEDICINE

## 2022-08-03 ENCOUNTER — TELEPHONE (OUTPATIENT)
Dept: CARDIOLOGY CLINIC | Age: 67
End: 2022-08-03

## 2022-08-03 ENCOUNTER — HOSPITAL ENCOUNTER (OUTPATIENT)
Dept: CT IMAGING | Age: 67
Discharge: HOME | End: 2022-08-06
Payer: MEDICARE

## 2022-08-03 VITALS
RESPIRATION RATE: 16 BRPM | OXYGEN SATURATION: 98 % | HEART RATE: 67 BPM | SYSTOLIC BLOOD PRESSURE: 122 MMHG | DIASTOLIC BLOOD PRESSURE: 64 MMHG

## 2022-08-03 DIAGNOSIS — R07.9 CHEST PAIN, UNSPECIFIED TYPE: ICD-10-CM

## 2022-08-03 LAB — CREAT BLD-MCNC: 0.91 MG/DL (ref 0.8–1.5)

## 2022-08-03 PROCEDURE — 75574 CT ANGIO HRT W/3D IMAGE: CPT

## 2022-08-03 PROCEDURE — 75574 CT ANGIO HRT W/3D IMAGE: CPT | Performed by: INTERNAL MEDICINE

## 2022-08-03 PROCEDURE — 2580000003 HC RX 258: Performed by: INTERNAL MEDICINE

## 2022-08-03 PROCEDURE — 82565 ASSAY OF CREATININE: CPT

## 2022-08-03 PROCEDURE — 6370000000 HC RX 637 (ALT 250 FOR IP): Performed by: INTERNAL MEDICINE

## 2022-08-03 PROCEDURE — 6360000004 HC RX CONTRAST MEDICATION: Performed by: INTERNAL MEDICINE

## 2022-08-03 PROCEDURE — 3209999900 CT CARDIAC OVER READ

## 2022-08-03 RX ORDER — SODIUM CHLORIDE 0.9 % (FLUSH) 0.9 %
10 SYRINGE (ML) INJECTION
Status: COMPLETED | OUTPATIENT
Start: 2022-08-03 | End: 2022-08-03

## 2022-08-03 RX ORDER — NITROGLYCERIN 0.4 MG/1
0.4 TABLET SUBLINGUAL ONCE
Status: COMPLETED | OUTPATIENT
Start: 2022-08-03 | End: 2022-08-03

## 2022-08-03 RX ORDER — 0.9 % SODIUM CHLORIDE 0.9 %
100 INTRAVENOUS SOLUTION INTRAVENOUS
Status: COMPLETED | OUTPATIENT
Start: 2022-08-03 | End: 2022-08-03

## 2022-08-03 RX ADMIN — IOPAMIDOL 75 ML: 755 INJECTION, SOLUTION INTRAVENOUS at 08:36

## 2022-08-03 RX ADMIN — SODIUM CHLORIDE, PRESERVATIVE FREE 10 ML: 5 INJECTION INTRAVENOUS at 08:37

## 2022-08-03 RX ADMIN — SODIUM CHLORIDE 100 ML: 900 INJECTION, SOLUTION INTRAVENOUS at 08:37

## 2022-08-03 RX ADMIN — NITROGLYCERIN 0.4 MG: 0.4 TABLET, ORALLY DISINTEGRATING SUBLINGUAL at 08:28

## 2022-08-03 NOTE — TELEPHONE ENCOUNTER
----- Message from Bradley Powell MD sent at 8/3/2022  4:46 PM EDT -----  Please let the patient know that he has evidence of moderate stenosis in the mid RCA. Medical management is a priority since it is a nonproximal location. He has close follow-up to discuss this further.

## 2022-08-15 NOTE — PROGRESS NOTES
[facility] CARDIOLOGY Follow Up                 Reason for Visit: Abnormal cardiac CT angiogram    Subjective:     Patient is a 61 y.o. male with a PMH of abnormal cardiac CT angiogram, hypertension and hyperlipidemia who presents for follow-up. The patient was last seen in July 2022. A CCTA was ordered for chest pain. The patient was noted to have moderate stenosis of the mid RCA with a CAC score of 74. The patient continues to report occasional chest pain that is worse with exertion. He also reports LAM. Past Medical History:   Diagnosis Date    Angina pectoris ([facility] [address] 11/17/2016    Anxiety 11/17/2016    Arm pain 11/17/2016    Asthma     controlled with prn medication. Last exacerbation March 2011    BPH (benign prostatic hyperplasia) 11/17/2016    Chest pain 11/17/2016    -daily  OV: 4/25/13 He continues to have substernal pressure with ex., relieved with rest. Stress test was non-dx. Cholelithiasis without cholecystitis 11/04/2010    Chronic airway obstruction, not elsewhere classified 9/21/2010    Chronic back pain 11/17/2016    Chronic leg pain 11/17/2016    Depression     Dyslipidemia 11/17/2016    Atherogenic Dyslipidemia/Hyperlipidemia    Generalized anxiety disorder 11/17/2016    GERD (gastroesophageal reflux disease)     controlled with med. History of kidney stones     HTN (hypertension) 11/17/2016    Hypercholesterolemia     controlled with medication    Hypertension     controlled with meds.     Ill-defined condition     Break Out on Lt Leg    Insomnia secondary to anxiety 11/17/2016    Major depressive disorder, recurrent ([address] 11/17/2016    Petechiae 11/17/2016    Rectal bleeding 11/17/2016    SOB (shortness of breath) 12/2019    Tobacco dependence 11/17/2016    Tobacco use disorder 11/17/2016      Past Surgical History:   Procedure Laterality Date    CHOLECYSTECTOMY  2011    HERNIA REPAIR  age 12 yrs    HERNIA REPAIR Bilateral     HERNIA REPAIR  age 17 yrs    LITHOTRIPSY  late 1980's Family History   Problem Relation Age of Onset    Heart Disease Sister     Lung Disease Mother     Diabetes Brother     Heart Disease Brother     Heart Disease Sister     Diabetes Sister     Diabetes Father     Hypertension Father     Heart Disease Father     Diabetes Brother     Heart Disease Brother     Stroke Paternal Grandfather     Heart Attack Paternal Grandfather     Coronary Art Dis Other         Family Hx of CAD    Heart Failure Other         A sister with CHF    Stroke Other         A sister with stroke    Elevated Lipids Other         Family Hx of Hyperlipidemia    Heart Disease Mother     Diabetes Mother     Diabetes Sister       Social History     Tobacco Use    Smoking status: Every Day     Packs/day: 0.25     Years: 46.00     Pack years: 11.50     Types: Cigarettes    Smokeless tobacco: Never    Tobacco comments:     Quit smokin-2 cig per day   Substance Use Topics    Alcohol use: No      No Known Allergies      ROS:  No obvious pertinent positives on review of systems except for what was outlined above.        Objective:       /80   Pulse 64   Ht 5' 5\" (1.651 m)   Wt 157 lb (71.2 kg)   BMI 26.13 kg/m²     BP Readings from Last 3 Encounters:   22 124/80   22 122/64   22 110/60       Wt Readings from Last 3 Encounters:   22 157 lb (71.2 kg)   22 159 lb (72.1 kg)   22 159 lb 12.8 oz (72.5 kg)       General/Constitutional:   Alert and oriented x 3, no acute distress  HEENT:   normocephalic, atraumatic, moist mucous membranes  Neck:   No JVD or carotid bruits bilaterally  Cardiovascular:   regular rate and rhythm, no rub/gallop appreciated  Pulmonary:   clear to auscultation bilaterally, no respiratory distress  Abdomen:   soft, non-tender, non-distended  Ext:   No sig LE edema bilaterally  Skin:  warm and dry, no obvious rashes seen  Neuro:   no obvious sensory or motor deficits  Psychiatric:   normal mood and affect    Data Review:   Lab Results Component Value Date    CHOL 131 03/15/2022    CHOL 152 05/07/2021    CHOL 144 10/27/2020     Lab Results   Component Value Date    TRIG 148 03/15/2022    TRIG 351 (H) 05/07/2021    TRIG 253 (H) 10/27/2020     Lab Results   Component Value Date    HDL 42 03/15/2022    HDL 40 05/07/2021    HDL 38 (L) 10/27/2020     Lab Results   Component Value Date    LDLCALC 59.4 03/15/2022    LDLCALC 41.8 05/07/2021    LDLCALC 55.4 10/27/2020     Lab Results   Component Value Date    LABVLDL 29.6 (H) 03/15/2022    LABVLDL 70.2 (H) 05/07/2021    LABVLDL 50.6 (H) 10/27/2020     Lab Results   Component Value Date    CHOLHDLRATIO 3.1 03/15/2022    CHOLHDLRATIO 3.8 05/07/2021    CHOLHDLRATIO 3.8 10/27/2020        Lab Results   Component Value Date/Time     03/15/2022 12:02 PM     05/07/2021 11:14 AM     10/27/2020 12:00 PM    K 4.7 03/15/2022 12:02 PM    K 4.4 05/07/2021 11:14 AM    K 4.2 10/27/2020 12:00 PM     03/15/2022 12:02 PM     05/07/2021 11:14 AM     10/27/2020 12:00 PM    CO2 32 03/15/2022 12:02 PM    CO2 30 05/07/2021 11:14 AM    CO2 30 10/27/2020 12:00 PM    BUN 16 03/15/2022 12:02 PM    BUN 15 05/07/2021 11:14 AM    BUN 17 10/27/2020 12:00 PM    CREATININE 0.91 08/03/2022 08:16 AM    CREATININE 0.88 03/15/2022 12:02 PM    CREATININE 0.77 05/07/2021 11:14 AM    CREATININE 0.94 10/27/2020 12:00 PM    GLUCOSE 118 03/15/2022 12:02 PM    GLUCOSE 87 05/07/2021 11:14 AM    GLUCOSE 98 10/27/2020 12:00 PM    CALCIUM 9.0 03/15/2022 12:02 PM    CALCIUM 9.2 05/07/2021 11:14 AM    CALCIUM 9.1 10/27/2020 12:00 PM         Lab Results   Component Value Date    ALT 36 03/15/2022    ALT 42 05/07/2021    ALT 35 10/27/2020    AST 22 03/15/2022    AST 25 05/07/2021    AST 20 10/27/2020        Assessment/Plan:   1. Abnormal cardiac CT angiography  - Moderate stenosis of the mid RCA noted on recent CCTA  - Continue with baby aspirin daily and Crestor    2.  Hypertension, unspecified type  - Well controlled  - Currently on atenolol    3. Hyperlipidemia, unspecified hyperlipidemia type  - Continue with Crestor    4. LAM (dyspnea on exertion)  - Obtain an echocardiogram   - Obtain an ischemic evaluation with a C    5.  Chest pain, unspecified type  - In the setting of chest pain (concern for angina), abnormal CCTA and LAM (concern for anginal equivalent), it is reasonable to pursue an angiogram  - Obtain precath labs and an angiogram pending precath labs    - PE unlikely per PE Wells score    F/U: Post cath    Rachel Almaguer MD

## 2022-08-16 ENCOUNTER — OFFICE VISIT (OUTPATIENT)
Dept: CARDIOLOGY CLINIC | Age: 67
End: 2022-08-16
Payer: MEDICARE

## 2022-08-16 VITALS
HEIGHT: 65 IN | BODY MASS INDEX: 26.16 KG/M2 | WEIGHT: 157 LBS | HEART RATE: 64 BPM | SYSTOLIC BLOOD PRESSURE: 124 MMHG | DIASTOLIC BLOOD PRESSURE: 80 MMHG

## 2022-08-16 DIAGNOSIS — R06.09 DOE (DYSPNEA ON EXERTION): ICD-10-CM

## 2022-08-16 DIAGNOSIS — R07.9 CHEST PAIN, UNSPECIFIED TYPE: ICD-10-CM

## 2022-08-16 DIAGNOSIS — E78.5 HYPERLIPIDEMIA, UNSPECIFIED HYPERLIPIDEMIA TYPE: ICD-10-CM

## 2022-08-16 DIAGNOSIS — I10 HYPERTENSION, UNSPECIFIED TYPE: ICD-10-CM

## 2022-08-16 DIAGNOSIS — R93.1 ABNORMAL CARDIAC CT ANGIOGRAPHY: ICD-10-CM

## 2022-08-16 DIAGNOSIS — R93.1 ABNORMAL CARDIAC CT ANGIOGRAPHY: Primary | ICD-10-CM

## 2022-08-16 LAB
ANION GAP SERPL CALC-SCNC: 4 MMOL/L (ref 7–16)
BUN SERPL-MCNC: 14 MG/DL (ref 8–23)
CALCIUM SERPL-MCNC: 9.2 MG/DL (ref 8.3–10.4)
CHLORIDE SERPL-SCNC: 108 MMOL/L (ref 98–107)
CO2 SERPL-SCNC: 28 MMOL/L (ref 21–32)
CREAT SERPL-MCNC: 0.9 MG/DL (ref 0.8–1.5)
ERYTHROCYTE [DISTWIDTH] IN BLOOD BY AUTOMATED COUNT: 12.4 % (ref 11.9–14.6)
GLUCOSE SERPL-MCNC: 113 MG/DL (ref 65–100)
HCT VFR BLD AUTO: 41.6 % (ref 41.1–50.3)
HGB BLD-MCNC: 14.4 G/DL (ref 13.6–17.2)
MCH RBC QN AUTO: 31.6 PG (ref 26.1–32.9)
MCHC RBC AUTO-ENTMCNC: 34.6 G/DL (ref 31.4–35)
MCV RBC AUTO: 91.2 FL (ref 79.6–97.8)
NRBC # BLD: 0 K/UL (ref 0–0.2)
PLATELET # BLD AUTO: 153 K/UL (ref 150–450)
PMV BLD AUTO: 10.2 FL (ref 9.4–12.3)
POTASSIUM SERPL-SCNC: 4.5 MMOL/L (ref 3.5–5.1)
RBC # BLD AUTO: 4.56 M/UL (ref 4.23–5.6)
SODIUM SERPL-SCNC: 140 MMOL/L (ref 138–145)
WBC # BLD AUTO: 6.7 K/UL (ref 4.3–11.1)

## 2022-08-16 PROCEDURE — 99214 OFFICE O/P EST MOD 30 MIN: CPT | Performed by: INTERNAL MEDICINE

## 2022-08-16 PROCEDURE — 4004F PT TOBACCO SCREEN RCVD TLK: CPT | Performed by: INTERNAL MEDICINE

## 2022-08-16 PROCEDURE — 3017F COLORECTAL CA SCREEN DOC REV: CPT | Performed by: INTERNAL MEDICINE

## 2022-08-16 PROCEDURE — G8419 CALC BMI OUT NRM PARAM NOF/U: HCPCS | Performed by: INTERNAL MEDICINE

## 2022-08-16 PROCEDURE — G8427 DOCREV CUR MEDS BY ELIG CLIN: HCPCS | Performed by: INTERNAL MEDICINE

## 2022-08-25 ENCOUNTER — HOSPITAL ENCOUNTER (OUTPATIENT)
Age: 67
Setting detail: OUTPATIENT SURGERY
Discharge: HOME | End: 2022-08-25
Attending: INTERNAL MEDICINE | Admitting: INTERNAL MEDICINE
Payer: MEDICARE

## 2022-08-25 VITALS
WEIGHT: 157 LBS | BODY MASS INDEX: 26.16 KG/M2 | HEART RATE: 57 BPM | SYSTOLIC BLOOD PRESSURE: 114 MMHG | TEMPERATURE: 98.4 F | HEIGHT: 65 IN | DIASTOLIC BLOOD PRESSURE: 76 MMHG

## 2022-08-25 DIAGNOSIS — I20.0 ACCELERATING ANGINA (HCC): ICD-10-CM

## 2022-08-25 PROCEDURE — 6360000004 HC RX CONTRAST MEDICATION: Performed by: INTERNAL MEDICINE

## 2022-08-25 PROCEDURE — C1894 INTRO/SHEATH, NON-LASER: HCPCS | Performed by: INTERNAL MEDICINE

## 2022-08-25 PROCEDURE — 99152 MOD SED SAME PHYS/QHP 5/>YRS: CPT | Performed by: INTERNAL MEDICINE

## 2022-08-25 PROCEDURE — 2709999900 HC NON-CHARGEABLE SUPPLY: Performed by: INTERNAL MEDICINE

## 2022-08-25 PROCEDURE — 2500000003 HC RX 250 WO HCPCS: Performed by: INTERNAL MEDICINE

## 2022-08-25 PROCEDURE — 6360000002 HC RX W HCPCS: Performed by: INTERNAL MEDICINE

## 2022-08-25 PROCEDURE — C1769 GUIDE WIRE: HCPCS | Performed by: INTERNAL MEDICINE

## 2022-08-25 PROCEDURE — 93458 L HRT ARTERY/VENTRICLE ANGIO: CPT | Performed by: INTERNAL MEDICINE

## 2022-08-25 RX ORDER — HEPARIN SODIUM 200 [USP'U]/100ML
INJECTION, SOLUTION INTRAVENOUS CONTINUOUS PRN
Status: DISCONTINUED | OUTPATIENT
Start: 2022-08-25 | End: 2022-08-25 | Stop reason: HOSPADM

## 2022-08-25 RX ORDER — LIDOCAINE HYDROCHLORIDE 10 MG/ML
INJECTION, SOLUTION INFILTRATION; PERINEURAL PRN
Status: DISCONTINUED | OUTPATIENT
Start: 2022-08-25 | End: 2022-08-25 | Stop reason: HOSPADM

## 2022-08-25 RX ORDER — MIDAZOLAM HYDROCHLORIDE 1 MG/ML
INJECTION INTRAMUSCULAR; INTRAVENOUS PRN
Status: DISCONTINUED | OUTPATIENT
Start: 2022-08-25 | End: 2022-08-25 | Stop reason: HOSPADM

## 2022-08-25 NOTE — Clinical Note
Prepped: right groin and right radial. Site was marked, clipped and prepped. Prepped with: ChloraPrep. Patient was draped after wet prep solution dried.

## 2022-08-25 NOTE — PROGRESS NOTES
Patient up to bedside, vital signs and site stable. Patient ambulated to bathroom without difficulty. Patient voided without difficulty. Vascular site stable. Discharge instructions and home medications reviewed with patient. Time allowed for questions and answers. Site stable after ambulation. Peripheral IV sites dc'd without difficulty with tips intact. [address]keon 81 Patient discharged to home with family.

## 2022-08-25 NOTE — Clinical Note
TRANSFER - OUT REPORT:     Verbal report given to: RN. Report consisted of patient's Situation, Background, Assessment and   Recommendations(SBAR). Opportunity for questions and clarification was provided. Patient transported with a Registered Nurse. Patient transported to: [facility].

## 2022-08-25 NOTE — PROGRESS NOTES
Terumo band completely deflated. 1225 Terumo band removed from right wrist using sterile technique. Sterile dressing applied. No signs and symptoms of bleeding, oozing or hematoma.

## 2022-08-25 NOTE — PROGRESS NOTES
Report received from [place] [id number]. Procedural findings communicated. Intra procedural  medication administration reviewed. Progression of care discussed.      Patient received into [id number] [facility] 5 post sheath removal.     Access site without bleeding or swelling yes    Dressing dry and intact yes    Patient instructed to limit movement to right upper extremity    Routine post procedural vital signs and site assessment initiated yes

## 2022-08-25 NOTE — Clinical Note
Vessel: right radial artery. Closed using: radial band. Radial band pressure set at: 12 mL. None available None available None available None available None available None available None available None available None available None available None available None available None available None available None available Yes None available None available None available

## 2022-08-25 NOTE — DISCHARGE INSTRUCTIONS
HEART CATHETERIZATION/ANGIOGRAPHY DISCHARGE INSTRUCTIONS    Check puncture site frequently for swelling or bleeding. If there is any bleeding, apply pressure over the area with a clean towel or washcloth and call 911. Notify your doctor for any redness, swelling, drainage, or oozing from the puncture site. Notify your doctor for any fever or chills. If the extremity becomes cold, numb, or painful call Dr. Adina White at [phone]. Activity should be limited for the next 48 hours. No heavy lifting, pushing, pulling  or strenuous activity for 48 hours. No heavy lifting (anything over 10 pounds) for 3 days. You may resume your usual diet. Drink more fluids than usual.  Have a responsible person drive you home and stay with you for at least 24 hours after your heart catheterization/angiography. You may remove bandage from your right arm in 24 hours. You may shower in 24 hours. No tub baths, hot tubs, or swimming for 1 week. Do not place any lotions, creams, powders, or ointments over puncture site for 1 week. You may place a clean band-aid over the puncture site each day for 5 days. Change daily. Sedation for a Medical Procedure: Care Instructions     You were given a sedative medication during your visit. While many of the effects will have worn   off before you leave; you may continue to feel some effects for several hours. Common side effects from sedation include:  Feeling sleepy. (Your doctors and nurses will make sure you are not too sleepy to go home.)  Nausea and vomiting. This usually does not last long. Feeling tired. How can you care for yourself at home? Activity    Don't do anything for 24 hours that requires attention to detail. It takes time for the medicine effects to completely wear off. Do not make important legal decisions for 24 hours. Do not sign any legal documents for 24 hours.      Do not drink alcohol today     For your safety, you should not drive or operate heavy machinery for the remainder of the day     Rest when you feel tired. Getting enough sleep will help you recover. Diet    You can eat your normal diet, unless your doctor gives you other instructions. If your stomach is upset, try clear liquids and bland, low-fat foods like plain toast or rice. Drink plenty of fluids (unless your doctor tells you not to). Don't drink alcohol for 24 hours. Medicines    Be safe with medicines. Read and follow all instructions on the label. If the doctor gave you a prescription medicine for pain, take it as prescribed. If you are not taking a prescription pain medicine, ask your doctor if you can take an over-the-counter medicine. If you think your pain medicine is making you sick to your stomach: Take your medicine after meals (unless your doctor has told you not to). Ask your doctor for a different pain medicine. I have read the above instructions and have had the opportunity to ask questions.       Patient: ________________________   Date: _____________    Witness: _______________________   Date: _____________

## 2022-08-25 NOTE — PROGRESS NOTES
TRANSFER - OUT REPORT:    Verbal report given to RN on Goldy Benito  being transferred to [facility] for routine progression of patient care       Report consisted of patient's Situation, Background, Assessment and   Recommendations(SBAR). Information from the following report(s) Nurse Handoff Report was reviewed with the receiving nurse.     [facility]  Diagnostic  RRA - 12 mls    2 mg Versed  50 mcg Fentanyl  5000 units Heparin

## 2022-09-05 NOTE — PROGRESS NOTES
[facility] CARDIOLOGY Follow Up                 Reason for Visit: Post [facility] follow-up    Subjective:     Patient is a 61 y.o. male with a PMH of elevated CAC score (74) and hyperlipidemia who presents as a post [facility] follow-up. The patient was directed to get an C in the setting of an abnormal CCTA. An echocardiogram was ordered for LAM. The patient had an [address] on August 25 that was noted to demonstrate essentially normal coronary arteries. The patient did not get his echocardiogram in the hospital.  The patient reports an occasional \"pressure\" in the chest.  He reports LAM. He reports that the chest pain is occasionally \"sharp\". He denies hemoptysis. The patient does not report any alleviating or aggravating factors for the chest pain when it occurs. Past Medical History:   Diagnosis Date    Angina pectoris ([facility] [address] 11/17/2016    Anxiety 11/17/2016    Arm pain 11/17/2016    Asthma     controlled with prn medication. Last exacerbation March 2011    BPH (benign prostatic hyperplasia) 11/17/2016    Chest pain 11/17/2016    -daily  OV: 4/25/13 He continues to have substernal pressure with ex., relieved with rest. Stress test was non-dx. Cholelithiasis without cholecystitis 11/04/2010    Chronic airway obstruction, not elsewhere classified 9/21/2010    Chronic back pain 11/17/2016    Chronic leg pain 11/17/2016    Depression     Dyslipidemia 11/17/2016    Atherogenic Dyslipidemia/Hyperlipidemia    Generalized anxiety disorder 11/17/2016    GERD (gastroesophageal reflux disease)     controlled with med. History of kidney stones     HTN (hypertension) 11/17/2016    Hypercholesterolemia     controlled with medication    Hypertension     controlled with meds.     Ill-defined condition     Break Out on Lt Leg    Insomnia secondary to anxiety 11/17/2016    Major depressive disorder, recurrent ([address] 11/17/2016    Petechiae 11/17/2016    Rectal bleeding 11/17/2016    SOB (shortness of breath) 12/2019    Tobacco bilaterally  Cardiovascular:   regular rate and rhythm, no rub/gallop appreciated  Pulmonary:   clear to auscultation bilaterally, no respiratory distress  Abdomen:   soft, non-tender, non-distended  Ext:   No sig LE edema bilaterally  Skin:  warm and dry, no obvious rashes seen  Neuro:   no obvious sensory or motor deficits  Psychiatric:   normal mood and affect    Data Review:   Lab Results   Component Value Date    CHOL 131 03/15/2022    CHOL 152 05/07/2021    CHOL 144 10/27/2020     Lab Results   Component Value Date    TRIG 148 03/15/2022    TRIG 351 (H) 05/07/2021    TRIG 253 (H) 10/27/2020     Lab Results   Component Value Date    HDL 42 03/15/2022    HDL 40 05/07/2021    HDL 38 (L) 10/27/2020     Lab Results   Component Value Date    LDLCALC 59.4 03/15/2022    LDLCALC 41.8 05/07/2021    LDLCALC 55.4 10/27/2020     Lab Results   Component Value Date    LABVLDL 29.6 (H) 03/15/2022    LABVLDL 70.2 (H) 05/07/2021    LABVLDL 50.6 (H) 10/27/2020     Lab Results   Component Value Date    CHOLHDLRATIO 3.1 03/15/2022    CHOLHDLRATIO 3.8 05/07/2021    CHOLHDLRATIO 3.8 10/27/2020        Lab Results   Component Value Date/Time     08/16/2022 10:14 AM     03/15/2022 12:02 PM     05/07/2021 11:14 AM    K 4.5 08/16/2022 10:14 AM    K 4.7 03/15/2022 12:02 PM    K 4.4 05/07/2021 11:14 AM     08/16/2022 10:14 AM     03/15/2022 12:02 PM     05/07/2021 11:14 AM    CO2 28 08/16/2022 10:14 AM    CO2 32 03/15/2022 12:02 PM    CO2 30 05/07/2021 11:14 AM    BUN 14 08/16/2022 10:14 AM    BUN 16 03/15/2022 12:02 PM    BUN 15 05/07/2021 11:14 AM    CREATININE 0.90 08/16/2022 10:14 AM    CREATININE 0.91 08/03/2022 08:16 AM    CREATININE 0.88 03/15/2022 12:02 PM    CREATININE 0.77 05/07/2021 11:14 AM    GLUCOSE 113 08/16/2022 10:14 AM    GLUCOSE 118 03/15/2022 12:02 PM    GLUCOSE 87 05/07/2021 11:14 AM    CALCIUM 9.2 08/16/2022 10:14 AM    CALCIUM 9.0 03/15/2022 12:02 PM    CALCIUM 9.2 05/07/2021 11:14 AM         Lab Results   Component Value Date    ALT 36 03/15/2022    ALT 42 05/07/2021    ALT 35 10/27/2020    AST 22 03/15/2022    AST 25 05/07/2021    AST 20 10/27/2020        Assessment/Plan:   1. LAM (dyspnea on exertion)  - LHC essentially demonstrated normal coronary arteries   - Obtain an echocardiogram     2. Agatston coronary artery calcium score less than 100  - No robust evidence of aspirin use for this indication  - Patient would like to consider discontinuation of aspirin at home  - Continue with Crestor    3. Hyperlipidemia, unspecified hyperlipidemia type  - Continue with Crestor  - Discussed the lack of robust data for fish oil supplement use to reduce cardiovascular outcomes and discussed that fish oil supplement use may increase the risk of atrial fibrillation  - Patient would like to consider discontinuation of fish oil supplementation at home    4. Atypical chest pain  - LHC essentially demonstrated normal coronary arteries   - Low clinical suspicion for pericarditis or aortic dissection  - PE unlikely per PE Wells score  - Defer further work up to PCP    5.  Hypertension, unspecified type  - Well controlled  - Currently on atenolol    F/U: 6 months    Cookie Hemphill MD

## 2022-09-07 ENCOUNTER — OFFICE VISIT (OUTPATIENT)
Dept: CARDIOLOGY CLINIC | Age: 67
End: 2022-09-07
Payer: MEDICARE

## 2022-09-07 VITALS
SYSTOLIC BLOOD PRESSURE: 110 MMHG | WEIGHT: 154 LBS | BODY MASS INDEX: 25.66 KG/M2 | DIASTOLIC BLOOD PRESSURE: 60 MMHG | HEART RATE: 72 BPM | HEIGHT: 65 IN

## 2022-09-07 DIAGNOSIS — R93.1 AGATSTON CORONARY ARTERY CALCIUM SCORE LESS THAN 100: ICD-10-CM

## 2022-09-07 DIAGNOSIS — R07.89 ATYPICAL CHEST PAIN: ICD-10-CM

## 2022-09-07 DIAGNOSIS — R06.09 DOE (DYSPNEA ON EXERTION): Primary | ICD-10-CM

## 2022-09-07 DIAGNOSIS — E78.5 HYPERLIPIDEMIA, UNSPECIFIED HYPERLIPIDEMIA TYPE: ICD-10-CM

## 2022-09-07 DIAGNOSIS — I10 HYPERTENSION, UNSPECIFIED TYPE: ICD-10-CM

## 2022-09-07 PROCEDURE — G8419 CALC BMI OUT NRM PARAM NOF/U: HCPCS | Performed by: INTERNAL MEDICINE

## 2022-09-07 PROCEDURE — G8427 DOCREV CUR MEDS BY ELIG CLIN: HCPCS | Performed by: INTERNAL MEDICINE

## 2022-09-07 PROCEDURE — 4004F PT TOBACCO SCREEN RCVD TLK: CPT | Performed by: INTERNAL MEDICINE

## 2022-09-07 PROCEDURE — 3017F COLORECTAL CA SCREEN DOC REV: CPT | Performed by: INTERNAL MEDICINE

## 2022-09-07 PROCEDURE — 99214 OFFICE O/P EST MOD 30 MIN: CPT | Performed by: INTERNAL MEDICINE

## 2022-09-15 ENCOUNTER — OFFICE VISIT (OUTPATIENT)
Dept: BEHAVIORAL/MENTAL HEALTH CLINIC | Age: 67
End: 2022-09-15
Payer: MEDICARE

## 2022-09-15 ENCOUNTER — OFFICE VISIT (OUTPATIENT)
Dept: PRIMARY CARE CLINIC | Facility: CLINIC | Age: 67
End: 2022-09-15
Payer: MEDICARE

## 2022-09-15 VITALS
BODY MASS INDEX: 25.66 KG/M2 | TEMPERATURE: 98.6 F | HEIGHT: 65 IN | HEART RATE: 55 BPM | DIASTOLIC BLOOD PRESSURE: 60 MMHG | WEIGHT: 154 LBS | SYSTOLIC BLOOD PRESSURE: 104 MMHG | OXYGEN SATURATION: 98 %

## 2022-09-15 VITALS
SYSTOLIC BLOOD PRESSURE: 107 MMHG | HEIGHT: 65 IN | HEART RATE: 53 BPM | WEIGHT: 154 LBS | OXYGEN SATURATION: 95 % | TEMPERATURE: 97.5 F | DIASTOLIC BLOOD PRESSURE: 51 MMHG | BODY MASS INDEX: 25.66 KG/M2

## 2022-09-15 DIAGNOSIS — G25.81 RESTLESS LEG SYNDROME: ICD-10-CM

## 2022-09-15 DIAGNOSIS — E78.5 DYSLIPIDEMIA: ICD-10-CM

## 2022-09-15 DIAGNOSIS — E66.3 OVERWEIGHT: ICD-10-CM

## 2022-09-15 DIAGNOSIS — F33.42 RECURRENT MAJOR DEPRESSIVE DISORDER, IN FULL REMISSION (HCC): Primary | ICD-10-CM

## 2022-09-15 DIAGNOSIS — J44.9 COPD, MODERATE (HCC): ICD-10-CM

## 2022-09-15 DIAGNOSIS — I10 ESSENTIAL HYPERTENSION: Primary | ICD-10-CM

## 2022-09-15 DIAGNOSIS — Z71.82 EXERCISE COUNSELING: ICD-10-CM

## 2022-09-15 DIAGNOSIS — Z71.3 DIETARY COUNSELING: ICD-10-CM

## 2022-09-15 DIAGNOSIS — K13.0 ANGULAR CHEILITIS: ICD-10-CM

## 2022-09-15 DIAGNOSIS — N40.0 BENIGN PROSTATIC HYPERPLASIA WITHOUT LOWER URINARY TRACT SYMPTOMS: ICD-10-CM

## 2022-09-15 DIAGNOSIS — F51.05 INSOMNIA DUE TO MENTAL DISORDER: ICD-10-CM

## 2022-09-15 DIAGNOSIS — K21.9 GASTROESOPHAGEAL REFLUX DISEASE WITHOUT ESOPHAGITIS: ICD-10-CM

## 2022-09-15 DIAGNOSIS — F41.1 GENERALIZED ANXIETY DISORDER: ICD-10-CM

## 2022-09-15 DIAGNOSIS — E78.1 HYPERTRIGLYCERIDEMIA: ICD-10-CM

## 2022-09-15 PROCEDURE — 3017F COLORECTAL CA SCREEN DOC REV: CPT | Performed by: PSYCHIATRY & NEUROLOGY

## 2022-09-15 PROCEDURE — 3017F COLORECTAL CA SCREEN DOC REV: CPT | Performed by: FAMILY MEDICINE

## 2022-09-15 PROCEDURE — G8419 CALC BMI OUT NRM PARAM NOF/U: HCPCS | Performed by: FAMILY MEDICINE

## 2022-09-15 PROCEDURE — G8427 DOCREV CUR MEDS BY ELIG CLIN: HCPCS | Performed by: PSYCHIATRY & NEUROLOGY

## 2022-09-15 PROCEDURE — 4004F PT TOBACCO SCREEN RCVD TLK: CPT | Performed by: PSYCHIATRY & NEUROLOGY

## 2022-09-15 PROCEDURE — 3023F SPIROM DOC REV: CPT | Performed by: FAMILY MEDICINE

## 2022-09-15 PROCEDURE — 99214 OFFICE O/P EST MOD 30 MIN: CPT | Performed by: PSYCHIATRY & NEUROLOGY

## 2022-09-15 PROCEDURE — 4004F PT TOBACCO SCREEN RCVD TLK: CPT | Performed by: FAMILY MEDICINE

## 2022-09-15 PROCEDURE — 99214 OFFICE O/P EST MOD 30 MIN: CPT | Performed by: FAMILY MEDICINE

## 2022-09-15 PROCEDURE — G8427 DOCREV CUR MEDS BY ELIG CLIN: HCPCS | Performed by: FAMILY MEDICINE

## 2022-09-15 PROCEDURE — G8419 CALC BMI OUT NRM PARAM NOF/U: HCPCS | Performed by: PSYCHIATRY & NEUROLOGY

## 2022-09-15 RX ORDER — ROSUVASTATIN CALCIUM 40 MG/1
40 TABLET, COATED ORAL DAILY
Qty: 90 TABLET | Refills: 1 | Status: SHIPPED | OUTPATIENT
Start: 2022-09-15

## 2022-09-15 RX ORDER — BUSPIRONE HYDROCHLORIDE 5 MG/1
5 TABLET ORAL 2 TIMES DAILY
Qty: 180 TABLET | Refills: 1 | Status: SHIPPED | OUTPATIENT
Start: 2022-09-15

## 2022-09-15 RX ORDER — TAMSULOSIN HYDROCHLORIDE 0.4 MG/1
0.4 CAPSULE ORAL DAILY
Qty: 90 CAPSULE | Refills: 1 | Status: SHIPPED | OUTPATIENT
Start: 2022-09-15

## 2022-09-15 RX ORDER — NYSTATIN 100000 U/G
OINTMENT TOPICAL
Qty: 60 G | Refills: 0 | Status: SHIPPED | OUTPATIENT
Start: 2022-09-15

## 2022-09-15 RX ORDER — ROPINIROLE 1 MG/1
1 TABLET, FILM COATED ORAL NIGHTLY
Qty: 90 TABLET | Refills: 1 | Status: SHIPPED | OUTPATIENT
Start: 2022-09-15

## 2022-09-15 RX ORDER — OMEPRAZOLE 40 MG/1
40 CAPSULE, DELAYED RELEASE ORAL DAILY
Qty: 90 CAPSULE | Refills: 1 | Status: SHIPPED | OUTPATIENT
Start: 2022-09-15

## 2022-09-15 RX ORDER — SERTRALINE HYDROCHLORIDE 100 MG/1
100 TABLET, FILM COATED ORAL EVERY MORNING
Qty: 90 TABLET | Refills: 1 | Status: SHIPPED | OUTPATIENT
Start: 2022-09-15

## 2022-09-15 RX ORDER — TRAZODONE HYDROCHLORIDE 100 MG/1
100 TABLET ORAL NIGHTLY
Qty: 90 TABLET | Refills: 1 | Status: SHIPPED | OUTPATIENT
Start: 2022-09-15

## 2022-09-15 RX ORDER — ATENOLOL 25 MG/1
25 TABLET ORAL DAILY
Qty: 90 TABLET | Refills: 1 | Status: SHIPPED | OUTPATIENT
Start: 2022-09-15

## 2022-09-15 ASSESSMENT — PATIENT HEALTH QUESTIONNAIRE - PHQ9
1. LITTLE INTEREST OR PLEASURE IN DOING THINGS: 0
SUM OF ALL RESPONSES TO PHQ QUESTIONS 1-9: 0
SUM OF ALL RESPONSES TO PHQ QUESTIONS 1-9: 2
10. IF YOU CHECKED OFF ANY PROBLEMS, HOW DIFFICULT HAVE THESE PROBLEMS MADE IT FOR YOU TO DO YOUR WORK, TAKE CARE OF THINGS AT HOME, OR GET ALONG WITH OTHER PEOPLE: 0
SUM OF ALL RESPONSES TO PHQ QUESTIONS 1-9: 2
SUM OF ALL RESPONSES TO PHQ QUESTIONS 1-9: 0
6. FEELING BAD ABOUT YOURSELF - OR THAT YOU ARE A FAILURE OR HAVE LET YOURSELF OR YOUR FAMILY DOWN: 0
SUM OF ALL RESPONSES TO PHQ QUESTIONS 1-9: 0
SUM OF ALL RESPONSES TO PHQ9 QUESTIONS 1 & 2: 0
9. THOUGHTS THAT YOU WOULD BE BETTER OFF DEAD, OR OF HURTING YOURSELF: 0
SUM OF ALL RESPONSES TO PHQ QUESTIONS 1-9: 0
5. POOR APPETITE OR OVEREATING: 0
2. FEELING DOWN, DEPRESSED OR HOPELESS: 0
8. MOVING OR SPEAKING SO SLOWLY THAT OTHER PEOPLE COULD HAVE NOTICED. OR THE OPPOSITE, BEING SO FIGETY OR RESTLESS THAT YOU HAVE BEEN MOVING AROUND A LOT MORE THAN USUAL: 0
SUM OF ALL RESPONSES TO PHQ QUESTIONS 1-9: 2
7. TROUBLE CONCENTRATING ON THINGS, SUCH AS READING THE NEWSPAPER OR WATCHING TELEVISION: 0
2. FEELING DOWN, DEPRESSED OR HOPELESS: 0
3. TROUBLE FALLING OR STAYING ASLEEP: 1
SUM OF ALL RESPONSES TO PHQ QUESTIONS 1-9: 2
4. FEELING TIRED OR HAVING LITTLE ENERGY: 1
SUM OF ALL RESPONSES TO PHQ9 QUESTIONS 1 & 2: 0
1. LITTLE INTEREST OR PLEASURE IN DOING THINGS: 0

## 2022-09-15 ASSESSMENT — ANXIETY QUESTIONNAIRES
7. FEELING AFRAID AS IF SOMETHING AWFUL MIGHT HAPPEN: 0
GAD7 TOTAL SCORE: 2
2. NOT BEING ABLE TO STOP OR CONTROL WORRYING: 0
3. WORRYING TOO MUCH ABOUT DIFFERENT THINGS: 0
4. TROUBLE RELAXING: 1
5. BEING SO RESTLESS THAT IT IS HARD TO SIT STILL: 1
6. BECOMING EASILY ANNOYED OR IRRITABLE: 0
1. FEELING NERVOUS, ANXIOUS, OR ON EDGE: 0
IF YOU CHECKED OFF ANY PROBLEMS ON THIS QUESTIONNAIRE, HOW DIFFICULT HAVE THESE PROBLEMS MADE IT FOR YOU TO DO YOUR WORK, TAKE CARE OF THINGS AT HOME, OR GET ALONG WITH OTHER PEOPLE: NOT DIFFICULT AT ALL

## 2022-09-15 ASSESSMENT — ENCOUNTER SYMPTOMS
GASTROINTESTINAL NEGATIVE: 1
ALLERGIC/IMMUNOLOGIC NEGATIVE: 1
SHORTNESS OF BREATH: 1
EYES NEGATIVE: 1

## 2022-09-15 NOTE — PROGRESS NOTES
9/15/2022 6/16/2022 8/9/2021   Feeling nervous, anxious, or on edge Not at all Several days -   Not being able to stop or control worrying Not at all Not at all -   Worrying too much about different things Not at all Several days -   Trouble relaxing Several days Not at all -   Being so restless that it is hard to sit still Several days Not at all -   Becoming easily annoyed or irritable Not at all Not at all -   Feeling afraid as if something awful might happen Not at all Not at all -   VIDA-7 Total Score 2 2 -   How difficult have these problems made it for you to do your work, take care of things at home, or get along with other people? Not difficult at all Not difficult at all Not difficult at all   Feeling nervous, anxious, or on edge - - Not at all   VIDA-7 Total Score - - 1          Chief complaint:  Pt says he feels pressure in his chest sometimes. Subjective:  Seen in person with his care provider Mr. Vanesa Brown. States he has had episodes of chest pressure and has seen Dr. Ángel Tyson his cardiologist.  Dr. Malena Cotto has advised to decrease his atenolol and taper off of Requip. Also she has asked him to make some diet changes and decrease the fish oil. They are wondering about decreasing the medications that I prescribed. Explained to them that if his chest pressure is coming from anxiety then decreasing the dose might worsen the anxiety. He wants to decrease the BuSpar to 5 mg twice a day. Supportive psychotherapy provided. He denies suicidal ideation/homicidal ideations. Denies symptoms of psychosis.     Patient Active Problem List   Diagnosis    GERD (gastroesophageal reflux disease)    Dark stools    Dizziness    Abnormal finding on MRI of brain    Prediabetes    COPD, moderate (HCC)    Bronchitis    BMI 28.0-28.9,adult    Mixed simple and mucopurulent chronic bronchitis (HCC)    Hypertension    Adjustment disorder with anxiety    Exercise counseling    Cough    Chronic back pain    Benign prostatic hyperplasia without lower urinary tract symptoms    Smoking    Dyslipidemia    Arthritis of left hand    Right elbow tendinitis    Sinusitis    Rectal bleeding    Moderate episode of recurrent major depressive disorder (HCC)    Overweight    Tobacco use    Gastroesophageal reflux disease without esophagitis    Atypical chest pain    Restless leg syndrome    Acute periumbilical pain    Lung nodule < 6cm on CT    Frequent urination    Depression    Urinary tract infection without hematuria    Left otitis media    Dietary counseling    Encounter for immunization    Hyperlipidemia    Generalized anxiety disorder    Chronic leg pain    Tobacco dependence    Allergic rhinitis    LAM (dyspnea on exertion)    Nonspecific abnormal finding in saliva    Chest wall pain    Angina at rest [facility])    Major depressive disorder, recurrent, in partial remission (HCC)    Agatston coronary artery calcium score less than 100     Denies palpitation,SOB, Chest pain, headaches. In no acute distress.        MEDICATION REVIEW:    Current Medications:    Current Outpatient Medications   Medication Sig    atenolol (TENORMIN) 25 MG tablet Take 1 tablet by mouth daily    omeprazole (PRILOSEC) 40 MG delayed release capsule Take 1 capsule by mouth daily    rosuvastatin (CRESTOR) 40 MG tablet Take 1 tablet by mouth daily    tamsulosin (FLOMAX) 0.4 MG capsule Take 1 capsule by mouth daily    rOPINIRole (REQUIP) 1 MG tablet Take 1 tablet by mouth nightly    sertraline (ZOLOFT) 100 MG tablet Take 1 tablet by mouth every morning    traZODone (DESYREL) 100 MG tablet Take 1 tablet by mouth nightly    busPIRone (BUSPAR) 5 MG tablet Take 1 tablet by mouth 2 times daily    Multiple Vitamin (MULTIVITAMINS PO) Take 1 tablet by mouth daily    Omega-3 Fatty Acids (FISH OIL) 1200 MG CAPS Take 1 capsule by mouth daily Indications: 1000mg    ascorbic acid (VITAMIN C) 250 MG tablet Take 250 mg by mouth daily     aspirin 81 MG EC tablet Take 81 mg by mouth daily ipratropium-albuterol (DUONEB) 0.5-2.5 (3) MG/3ML SOLN nebulizer solution Inhale 3 mLs into the lungs 3 times daily     No current facility-administered medications for this visit. No Known Allergies    Past Medical History, Past Surgical History, Family history, Social History, and Medications were all reviewed with the patient today and updated as necessary.      Compliant with medication: Yes   Side effects from medications:  No     EXAMINATION  Musculoskeletal    GAIT AND STATION   [x] WNL   [] RESTRICTED   [] UNSTEADY WALK        [] ABNORMAL   [] UNBALANCED         PSYCHIATRIC     GENERAL APPEARANCE:   [x]  WELL GROOMED []     DISHEVELED   []  UNKEMPT      []  UNUSUAL/BIZZARE    [] WNL       ATTITUDE:   [x] COOPERATIVE   [] GUARDED   [] SUSPICIOUS      [] HOSTILE                            BEHAVIOR:   [x] CALM   [] HYPERACTIVE   [] MANNERISMS      [] BIZZARE         SPEECH:   [x] NORMAL FOR CLIENT   [] SPONTANEOUS   [] SLURRED   [] WHISPERING      [] LOUD   [] PRESSURED   [] ARTICULATE       EYE CONTACT:   [x] WNL   [] BLANK STARE   [] INTENSE      [] AVOIDANT         MOOD:   [x] EUTHYMIC   [] ANXIOUS   [] DEPRESSED      [] IRRITABLE   [] ANGRY   [] APATHETIC     AFFECT:   [x] CONGRUENT WITH MOOD   [] FLAT   [] CONSTRICTED      [] INAPPROPRIATE   [] LABILE           THOUGHT PROCESS:   [x] LOGICAL/GOAL-DIRECTED   [] FOI   [] CIRCUMSANTIAL      [] INCOHERENT   [] TANGENTIAL   [] CONCRETE      [] PERSEVERATION           THOUGHT CONTENT:                DELUSIONS  [x] DENIES  [] GRANDIOSE  [] PERSECUTORY  [] Restoration  [] REFERENCE   HALLUCINATIONS  [x] DENIES  [] AUDITORY  [] VISUAL  [] OLFACTORY  [] TACTILE     [] GUSTATORY  [] SOMATIC         OBSESSIONS  [x] DENIES  [] PRESENT         SUICIDAL IDEATION  [x] DENIES  [] PRESENT W/O PLAN  [] PRESENT W/ PLAN       HOMICIDAL IDEATION  [x] DENIES  [] PRESENT W/O PLAN  [] PRESENT W/ PLAN           JUDGEMENT:   [x] GOOD   [] FAIR   [] POOR   INSIGHT:   [x] GOOD   [] FAIR   [] POOR     COGNITION:           SENSORIUM:   [x] ALERT   [] CLOUDED   [] DROWSY     ORIENTATION:   [x] INTACT   [] TIME:  PLACE  PERSON   RECENT & REMOTE MEMORY:   [] NORMAL   [x] OTHER:                  ATTENTION:   [] INTACT   [x] MILD IMPAIRMENT   [] SEVERE IMPAIRMENT     CONCENTRATION:   [] INTACT   [x] MILD IMPAIRMENT   [] SEVERE IMPAIRMENT     LANGUAGE:   [x] AVERAGE   [] ABOVE AVERAGE   [] BELOW AVERAGE     FUND OF KNOWLEDGE:   [] UNABLE TO ASSESS AT THIS TIME   [] AVERAGE   [] ABOVE AVERAGE   [] BELOW AVERAGE      [] GOOD TO EXCELLENT KNOWLEDGE OF CURRENT EVENTS   [] POOR TO NO KNLEDGE OF CURRENT EVENTS           ABNORMAL MOVEMENTS:   [x] NONE   [] TICS   [] TREMORS   [] BIZZARE      [] FACE   [] TRUNK   [] EXTREMETIES   [] GESTURES        SLEEP:   [x] GOOD   [] FAIR   [] POOR      MUSCLE STRENGTH AND TONE   [x] WNL   [] ATROPHY   [] SPASTIC        [] FLACCID   [] COGWHEEL         Diagnoses/Impressions:    ICD-10-CM    1. Recurrent major depressive disorder, in full remission ([facility]  F33.42       2. Generalized anxiety disorder  F41.1       3.  Insomnia due to mental disorder  F51.05           TREATMENT GOALS:  Symptom reduction, Medication adherence, maintain therapeutic gains    LABS/IMAGING:    []  Ordered [x]  Reviewed []  New Labs Ordered:     LAB  WBC   Date/Time Value Ref Range Status   08/16/2022 10:14 AM 6.7 4.3 - 11.1 K/uL Final     Hemoglobin   Date/Time Value Ref Range Status   08/16/2022 10:14 AM 14.4 13.6 - 17.2 g/dL Final     Hematocrit   Date/Time Value Ref Range Status   08/16/2022 10:14 AM 41.6 41.1 - 50.3 % Final     Platelets   Date/Time Value Ref Range Status   08/16/2022 10:14  150 - 450 K/uL Final     Sodium   Date/Time Value Ref Range Status   08/16/2022 10:14  138 - 145 mmol/L Final     Potassium   Date/Time Value Ref Range Status   08/16/2022 10:14 AM 4.5 3.5 - 5.1 mmol/L Final     Chloride   Date/Time Value Ref Range Status   08/16/2022 10:14  (H) 98 - 107 mmol/L Final     CO2   Date/Time Value Ref Range Status   08/16/2022 10:14 AM 28 21 - 32 mmol/L Final     BUN   Date/Time Value Ref Range Status   08/16/2022 10:14 AM 14 8 - 23 MG/DL Final     ALT   Date/Time Value Ref Range Status   03/15/2022 12:02 PM 36 12 - 65 U/L Final     AST   Date/Time Value Ref Range Status   03/15/2022 12:02 PM 22 15 - 37 U/L Final       Please refer to the lab tab in the epic and care everywhere for the most recent lab results. Plan:     [x]  Medication ordered: Zoloft, trazodone, BuSpar to target depression, anxiety, insomnia. [x]  Medication education/counseling provided  Medication dosage and time to take, purpose/expected benefits/risks, common side effects, lab monitoring required and reason, expected length of treatment, risk of no treatment, effects on pregnancy/nursing, financial availability. Educated patient on  side effects/risks/benefits of meds including cardiac arrhythmias, suicidal ideations, priapism, orthostatic hypotension, serotonin syndrome, risk of blake/hypomania from antidepressants, withdrawals from abrupt discontinuation of meds, risk of bleeding, risk of seizures, high blood pressure, dizziness, drowsiness, sedation , risk of falls, Risk & benefits discussed: including but not limited to possible off-label medication usage. [x] Follow MSE for sxs improvement     I have reviewed the patients controlled substance prescription history, as maintained in the [place] prescription monitoring program, so that the prescription(s) for a  controlled substance can be given. Recommendations and Referrals: Follow up with : MD, requires monitoring of response to medication, requires monitoring of medication side effects.     Time until next PMA:     Follow up with Mental Health Clinicians: psychotherapy interventions, improve level of functioning, monitoring to prevent decompensation /hospitalization, monitoring to maintain therapeutic gains, symptoms (resolving and controlled)           Psychotherapy note:                                __10_ Minutes of psychotherapy     [x]  Supportive psychotherapy, Patient discussed certain situational and personal stressors ongoing in his life at this time, weight management d/w the patient. Sleep hygiene d/w patient. Patient allowed to vent out his emotions. Scenarios were reviewed using role playing and CBT techniques in order to increase insight and decrease anxiety. []  Disposition planning      []  Dangerous and will not contract for safety in the community    **P[place]int has been notified: They are to call 911 or go to their nearest E.R. if they are experiencing a medical emergency or suicidal ideations/homicidal ideations. **  All ancillary documentation entered reviewed by provider. PLEASE NOTE:  This document has been produced in part or whole using voice recognition software. Proofread however unrecognized errors in transcription may be present. John Carranza MD            Would like to see if any medications can be decreased. Stated that the cardiologist is concerned about all of the medications that he is taking and feels it may be related to some of the discomfort he is experiencing- possible side effects. Stated that per Dr Anastasiia Salter this week Atenolol will be decreased to 12.5mg daily and monitor blood pressure daily as it was 107/51 this morning at his visit. Stated that Dr Anastasiia Salter advised to wean off of Requip- take one every other day for one week, then one every three days for a week and then d/c.

## 2022-09-15 NOTE — PROGRESS NOTES
[address] [facility], [facility] [address]  Office : [phone]  Fax : [phone]      Subjective: The patient is a 61 y.o. male  who presents for f/u on multiple chronic medical conditions-good compliance with medications--pt here to get routeine labs and need refill on meds. no cardiopulmonary symptoms  Pt lives with his long standing family friend who is care giver for this pt  pts family lives outside [place]  Pt accompanied by his friend   Hypertension--Pt BP been controlled with meds-LOW LAST FEW DAYS-pt asymptomatic  Prediabetes -pts blood sugar stable on med  Hyperlipidemia--pts on low carb diet and low fat diet -stable on med  Gerd -stable on diet /med  COPD--Pt seeing pulmonologist regularly  ANXIETY/DEPRESSION -WELL CONTROLLED ON MED--sees psychiatrist  Pt seen GI for GI bleed--all work up was negative-has gastritis-on PPI  Allergic rhinitis-likely from smoking  And environmental  Hx of ANGINA--on and off symptoms-had stress test in 2016-was normal--lately last 3 months more symptoms-uses nitro as needed-pt was seen by cardiologist in 9/2022-cardiac cath was NORMAL      Patient Active Problem List   Diagnosis    GERD (gastroesophageal reflux disease)    Dark stools    Dizziness    Abnormal finding on MRI of brain    Prediabetes    COPD, moderate (HCC)    Bronchitis    BMI 28.0-28.9,adult    Mixed simple and mucopurulent chronic bronchitis ([address]    Essential hypertension    Adjustment disorder with anxiety    Exercise counseling    Cough    Chronic back pain    Benign prostatic hyperplasia without lower urinary tract symptoms    Smoking    Dyslipidemia    Arthritis of left hand    Right elbow tendinitis    Sinusitis    Rectal bleeding    Moderate episode of recurrent major depressive disorder (HCC)    Overweight    Tobacco use    Gastroesophageal reflux disease without esophagitis    Atypical chest pain    Restless leg syndrome    Acute periumbilical pain    Lung nodule < 6cm on CT    Frequent urination    Depression    Urinary tract infection without hematuria    Left otitis media    Dietary counseling    Encounter for immunization    Hyperlipidemia    Generalized anxiety disorder    Chronic leg pain    Tobacco dependence    Allergic rhinitis    LAM (dyspnea on exertion)    Nonspecific abnormal finding in saliva    Chest wall pain    Angina at rest [facility])    Major depressive disorder, recurrent, in partial remission (HCC)    Agatston coronary artery calcium score less than 100    Angular cheilitis    Hypertriglyceridemia       Past Medical History:   Diagnosis Date    Angina pectoris ([facility] 11/17/2016    Anxiety 11/17/2016    Arm pain 11/17/2016    Asthma     controlled with prn medication. Last exacerbation March 2011    BPH (benign prostatic hyperplasia) 11/17/2016    Chest pain 11/17/2016    -daily  OV: 4/25/13 He continues to have substernal pressure with ex., relieved with rest. Stress test was non-dx. Cholelithiasis without cholecystitis 11/04/2010    Chronic airway obstruction, not elsewhere classified 9/21/2010    Chronic back pain 11/17/2016    Chronic leg pain 11/17/2016    Depression     Dyslipidemia 11/17/2016    Atherogenic Dyslipidemia/Hyperlipidemia    Generalized anxiety disorder 11/17/2016    GERD (gastroesophageal reflux disease)     controlled with med. History of kidney stones     HTN (hypertension) 11/17/2016    Hypercholesterolemia     controlled with medication    Hypertension     controlled with meds.     Ill-defined condition     Break Out on Lt Leg    Insomnia secondary to anxiety 11/17/2016    Major depressive disorder, recurrent ([facility] 11/17/2016    Petechiae 11/17/2016    Rectal bleeding 11/17/2016    SOB (shortness of breath) 12/2019    Tobacco dependence 11/17/2016    Tobacco use disorder 11/17/2016       Past Surgical History:   Procedure Laterality Date    CARDIAC PROCEDURE N/A 8/25/2022    Left heart cath / coronary angiography performed by Reinaldo De La Vega Nona Lua MD at [facility] CARDIAC CATH LAB    CHOLECYSTECTOMY  2011    HERNIA REPAIR  age 12 yrs    HERNIA REPAIR Bilateral     HERNIA REPAIR  age 17 yrs    LITHOTRIPSY  late 20's       Social History     Socioeconomic History    Marital status: Single     Spouse name: Not on file    Number of children: Not on file    Years of education: Not on file    Highest education level: Not on file   Occupational History    Not on file   Tobacco Use    Smoking status: Every Day     Packs/day: 0.25     Years: 46.00     Pack years: 11.50     Types: Cigarettes    Smokeless tobacco: Never    Tobacco comments:     Quit smokin-2 cig per day   Vaping Use    Vaping Use: Never used   Substance and Sexual Activity    Alcohol use: No    Drug use: No     Types: Prescription, OTC    Sexual activity: Not on file   Other Topics Concern    Not on file   Social History Narrative    Not on file     Social Determinants of Health     Financial Resource Strain: Not on file   Food Insecurity: Not on file   Transportation Needs: Not on file   Physical Activity: Not on file   Stress: Not on file   Social Connections: Not on file   Intimate Partner Violence: Not on file   Housing Stability: Not on file       No Known Allergies    Current Outpatient Medications   Medication Sig Dispense Refill    atenolol (TENORMIN) 25 MG tablet Take 1 tablet by mouth daily 90 tablet 1    omeprazole (PRILOSEC) 40 MG delayed release capsule Take 1 capsule by mouth daily 90 capsule 1    rosuvastatin (CRESTOR) 40 MG tablet Take 1 tablet by mouth daily 90 tablet 1    tamsulosin (FLOMAX) 0.4 MG capsule Take 1 capsule by mouth daily 90 capsule 1    rOPINIRole (REQUIP) 1 MG tablet Take 1 tablet by mouth nightly 90 tablet 1    nystatin (MYCOSTATIN) 436208 UNIT/GM ointment Apply topically 2 times daily for 2 weeks 60 g 0    Multiple Vitamin (MULTIVITAMINS PO) Take 1 tablet by mouth daily      Omega-3 Fatty Acids (FISH OIL) 1200 MG CAPS Take 1 capsule by mouth daily Indications: 1000mg      ascorbic acid (VITAMIN C) 250 MG tablet Take 250 mg by mouth daily       aspirin 81 MG EC tablet Take 81 mg by mouth daily      ipratropium-albuterol (DUONEB) 0.5-2.5 (3) MG/3ML SOLN nebulizer solution Inhale 3 mLs into the lungs 3 times daily      sertraline (ZOLOFT) 100 MG tablet Take 1 tablet by mouth every morning 90 tablet 1    traZODone (DESYREL) 100 MG tablet Take 1 tablet by mouth nightly 90 tablet 1    busPIRone (BUSPAR) 5 MG tablet Take 1 tablet by mouth 2 times daily 180 tablet 1     No current facility-administered medications for this visit. Review of Systems   Constitutional: Negative. HENT: Negative. Eyes: Negative. Respiratory:  Positive for shortness of breath. Gastrointestinal: Negative. Endocrine: Negative. Genitourinary: Negative. Musculoskeletal: Negative. Skin: Negative. Allergic/Immunologic: Negative. Neurological: Negative. Hematological: Negative. Psychiatric/Behavioral: Negative. Objective:    BP (!) 107/51 (Site: Left Upper Arm, Position: Sitting, Cuff Size: Large Adult)   Pulse 53   Temp 97.5 °F (36.4 °C) (Temporal)   Ht 5' 5\" (1.651 m)   Wt 154 lb (69.9 kg)   SpO2 95%   BMI 25.63 kg/m²     Physical Exam  Constitutional:       Appearance: Normal appearance. HENT:      Head: Normocephalic and atraumatic. Right Ear: External ear normal.      Left Ear: External ear normal.      Nose: Nose normal.      Mouth/Throat:      Mouth: Mucous membranes are moist.      Pharynx: Oropharynx is clear. Comments: Dry angle of mouth  Eyes:      Extraocular Movements: Extraocular movements intact. Conjunctiva/sclera: Conjunctivae normal.      Pupils: Pupils are equal, round, and reactive to light. Cardiovascular:      Rate and Rhythm: Normal rate and regular rhythm. Pulmonary:      Effort: Pulmonary effort is normal.      Breath sounds: Normal breath sounds.    Abdominal:      General: Bowel sounds are normal. Palpations: Abdomen is soft. Musculoskeletal:         General: Normal range of motion. Cervical back: Normal range of motion and neck supple. Skin:     General: Skin is warm. Neurological:      General: No focal deficit present. Mental Status: He is alert and oriented to person, place, and time. Psychiatric:         Mood and Affect: Mood normal.         Behavior: Behavior normal.         Thought Content: Thought content normal.         Judgment: Judgment normal.          ASSESSMENT/PLAN:    1. Essential hypertension  Overview:  Stable with med  Stress test 2016=normal  Refilled  Labs   Annual eye exam recommended  F/u in 3 months  9/2022  BP low  Advised to take atenolol 25 mg only 1/2 pill daily  Keep BP log and call in 2 weeks    Orders:  -     atenolol (TENORMIN) 25 MG tablet; Take 1 tablet by mouth daily, Disp-90 tablet, R-1Normal  -     Comprehensive Metabolic Panel; Future  -     Lipid Panel; Future  2. Gastroesophageal reflux disease without esophagitis  Overview:  Diet controlled now      Orders:  -     omeprazole (PRILOSEC) 40 MG delayed release capsule; Take 1 capsule by mouth daily, Disp-90 capsule, R-1Normal  -     Comprehensive Metabolic Panel; Future  -     Lipid Panel; Future  3. Dyslipidemia  Overview: On statin      Orders:  -     rosuvastatin (CRESTOR) 40 MG tablet; Take 1 tablet by mouth daily, Disp-90 tablet, R-1Normal  -     Comprehensive Metabolic Panel; Future  -     Lipid Panel; Future  4. Hypertriglyceridemia  Comments:  on fish oil   Orders:  -     Comprehensive Metabolic Panel; Future  -     Lipid Panel; Future  5. Benign prostatic hyperplasia without lower urinary tract symptoms  Overview:  Urologist  Last PSA 6/2019--normal      Orders:  -     tamsulosin (FLOMAX) 0.4 MG capsule; Take 1 capsule by mouth daily, Disp-90 capsule, R-1Normal  -     Comprehensive Metabolic Panel; Future  -     Lipid Panel; Future  6.  COPD, moderate tablet by mouth nightly     Dispense:  90 tablet     Refill:  1    nystatin (MYCOSTATIN) 872983 UNIT/GM ointment     Sig: Apply topically 2 times daily for 2 weeks     Dispense:  60 g     Refill:  0          No results found for any visits on 09/15/22. Lab Results   Component Value Date     08/16/2022    K 4.5 08/16/2022     (H) 08/16/2022    CO2 28 08/16/2022    BUN 14 08/16/2022    CREATININE 0.90 08/16/2022    GLUCOSE 113 (H) 08/16/2022    CALCIUM 9.2 08/16/2022    PROT 7.4 03/15/2022    LABALBU 3.8 03/15/2022    BILITOT 0.6 03/15/2022    ALKPHOS 92 03/15/2022    AST 22 03/15/2022    ALT 36 03/15/2022    LABGLOM >60 08/16/2022    GFRAA >60 08/16/2022    AGRATIO 1.1 (L) 03/15/2022    GLOB 3.6 (H) 03/15/2022     Lab Results   Component Value Date    WBC 6.7 08/16/2022    HGB 14.4 08/16/2022    HCT 41.6 08/16/2022    MCV 91.2 08/16/2022     08/16/2022     Lab Results   Component Value Date    LABA1C 5.3 03/15/2022     Lab Results   Component Value Date     03/15/2022     Lab Results   Component Value Date    CHOL 131 03/15/2022    CHOL 152 05/07/2021    CHOL 144 10/27/2020     Lab Results   Component Value Date    TRIG 148 03/15/2022    TRIG 351 (H) 05/07/2021    TRIG 253 (H) 10/27/2020     Lab Results   Component Value Date    HDL 42 03/15/2022    HDL 40 05/07/2021    HDL 38 (L) 10/27/2020     Lab Results   Component Value Date    LDLCALC 59.4 03/15/2022    LDLCALC 41.8 05/07/2021    LDLCALC 55.4 10/27/2020     Lab Results   Component Value Date    LABVLDL 29.6 (H) 03/15/2022    LABVLDL 70.2 (H) 05/07/2021    LABVLDL 50.6 (H) 10/27/2020     Lab Results   Component Value Date    CHOLHDLRATIO 3.1 03/15/2022    CHOLHDLRATIO 3.8 05/07/2021    CHOLHDLRATIO 3.8 10/27/2020           We discussed the expected course, resolution and complications of the diagnosis(es) in detail. Medication risks, benefits, costs, interactions, and alternatives were discussed as indicated.   I advised her to contact the office if her condition worsens, changes or fails to improve as anticipated. She expressed understanding with the diagnosis(es) and plan. Return in about 3 months (around 12/15/2022).      Donna Overton MD

## 2022-09-30 ENCOUNTER — TELEPHONE (OUTPATIENT)
Dept: CARDIOLOGY CLINIC | Age: 67
End: 2022-09-30

## 2022-09-30 NOTE — TELEPHONE ENCOUNTER
Patient does not want to do Echo here. He wants to go to [facility]. Please call patient at [phone] to direct.

## 2022-10-07 ENCOUNTER — TELEPHONE (OUTPATIENT)
Dept: CARDIOLOGY CLINIC | Age: 67
End: 2022-10-07

## 2022-10-07 DIAGNOSIS — R06.09 DOE (DYSPNEA ON EXERTION): Primary | ICD-10-CM

## 2022-10-11 ENCOUNTER — HOSPITAL ENCOUNTER (OUTPATIENT)
Dept: NON INVASIVE DIAGNOSTICS | Age: 67
Discharge: HOME | End: 2022-10-13
Payer: MEDICARE

## 2022-10-11 DIAGNOSIS — R06.09 DOE (DYSPNEA ON EXERTION): ICD-10-CM

## 2022-10-11 LAB
ECHO AO ASC DIAM: 3.1 CM
ECHO AO ROOT DIAM: 2.9 CM
ECHO AV AREA PEAK VELOCITY: 2.1 CM2
ECHO AV AREA VTI: 2.1 CM2
ECHO AV CUSP MM: 1.9 CM
ECHO AV MEAN GRADIENT: 6 MMHG
ECHO AV MEAN VELOCITY: 1.2 M/S
ECHO AV PEAK GRADIENT: 9 MMHG
ECHO AV PEAK VELOCITY: 1.5 M/S
ECHO AV VELOCITY RATIO: 0.67
ECHO AV VTI: 32.1 CM
ECHO EST RA PRESSURE: 3 MMHG
ECHO IVC PROX: 1.6 CM
ECHO LA AREA 2C: 14.3 CM2
ECHO LA AREA 4C: 17.5 CM2
ECHO LA DIAMETER: 2.8 CM
ECHO LA MAJOR AXIS: 5.8 CM
ECHO LA MINOR AXIS: 5.4 CM
ECHO LA TO AORTIC ROOT RATIO: 0.97
ECHO LA VOL BP: 38 ML (ref 18–58)
ECHO LV E' LATERAL VELOCITY: 15 CM/S
ECHO LV E' SEPTAL VELOCITY: 8 CM/S
ECHO LV EDV A2C: 81 ML
ECHO LV EDV A4C: 107 ML
ECHO LV EJECTION FRACTION A2C: 54 %
ECHO LV EJECTION FRACTION A4C: 55 %
ECHO LV EJECTION FRACTION BIPLANE: 56 % (ref 55–100)
ECHO LV ESV A2C: 37 ML
ECHO LV ESV A4C: 48 ML
ECHO LV FRACTIONAL SHORTENING: 28 % (ref 28–44)
ECHO LV INTERNAL DIMENSION DIASTOLIC: 4.7 CM (ref 4.2–5.9)
ECHO LV INTERNAL DIMENSION SYSTOLIC: 3.4 CM
ECHO LV IVSD: 0.9 CM (ref 0.6–1)
ECHO LV MASS 2D: 142.7 G (ref 88–224)
ECHO LV POSTERIOR WALL DIASTOLIC: 0.9 CM (ref 0.6–1)
ECHO LV RELATIVE WALL THICKNESS RATIO: 0.38
ECHO LVOT AREA: 3.1 CM2
ECHO LVOT AV VTI INDEX: 0.68
ECHO LVOT DIAM: 2 CM
ECHO LVOT MEAN GRADIENT: 2 MMHG
ECHO LVOT PEAK GRADIENT: 4 MMHG
ECHO LVOT PEAK VELOCITY: 1 M/S
ECHO LVOT SV: 68.5 ML
ECHO LVOT VTI: 21.8 CM
ECHO MV A VELOCITY: 0.88 M/S
ECHO MV E DECELERATION TIME (DT): 211 MS
ECHO MV E VELOCITY: 1.04 M/S
ECHO MV E/A RATIO: 1.18
ECHO MV E/E' LATERAL: 6.93
ECHO MV E/E' RATIO (AVERAGED): 9.97
ECHO MV E/E' SEPTAL: 13
ECHO PV ACCELERATION TIME (AT): 124 MS
ECHO PV MAX VELOCITY: 1.1 M/S
ECHO PV PEAK GRADIENT: 4 MMHG
ECHO RIGHT VENTRICULAR SYSTOLIC PRESSURE (RVSP): 24 MMHG
ECHO RV BASAL DIMENSION: 3.2 CM
ECHO RV TAPSE: 2.2 CM (ref 1.7–?)
ECHO TV REGURGITANT MAX VELOCITY: 2.27 M/S
ECHO TV REGURGITANT PEAK GRADIENT: 22 MMHG
LV EF: 53 %
LVEF MODALITY: ABNORMAL

## 2022-10-11 PROCEDURE — 2580000003 HC RX 258: Performed by: INTERNAL MEDICINE

## 2022-10-11 PROCEDURE — 93306 TTE W/DOPPLER COMPLETE: CPT | Performed by: INTERNAL MEDICINE

## 2022-10-11 PROCEDURE — C8929 TTE W OR WO FOL WCON,DOPPLER: HCPCS

## 2022-10-11 PROCEDURE — 6360000004 HC RX CONTRAST MEDICATION: Performed by: INTERNAL MEDICINE

## 2022-10-11 RX ADMIN — PERFLUTREN 0.45 ML: 6.52 INJECTION, SUSPENSION INTRAVENOUS at 16:10

## 2022-10-12 ENCOUNTER — TELEPHONE (OUTPATIENT)
Dept: CARDIOLOGY CLINIC | Age: 67
End: 2022-10-12

## 2022-10-12 NOTE — TELEPHONE ENCOUNTER
----- Message from Seema Diallo MD sent at 10/11/2022  5:45 PM EDT -----  Please let the patient know that the heart function is normal on ECHO. The patient does have mild mitral regurgitation documented on the echocardiogram.  Therefore, this warrants a surveillance echocardiogram in 3 to 5 years. Mild mitral regurgitation is a common finding on echocardiography and does not account for the patient's symptoms.

## 2022-12-15 ENCOUNTER — OFFICE VISIT (OUTPATIENT)
Dept: BEHAVIORAL/MENTAL HEALTH CLINIC | Age: 67
End: 2022-12-15
Payer: MEDICARE

## 2022-12-15 ENCOUNTER — OFFICE VISIT (OUTPATIENT)
Dept: PRIMARY CARE CLINIC | Facility: CLINIC | Age: 67
End: 2022-12-15
Payer: MEDICARE

## 2022-12-15 VITALS
WEIGHT: 163 LBS | HEIGHT: 65 IN | OXYGEN SATURATION: 96 % | DIASTOLIC BLOOD PRESSURE: 54 MMHG | BODY MASS INDEX: 27.16 KG/M2 | SYSTOLIC BLOOD PRESSURE: 110 MMHG | TEMPERATURE: 98.3 F | HEART RATE: 76 BPM

## 2022-12-15 VITALS
BODY MASS INDEX: 27.16 KG/M2 | OXYGEN SATURATION: 95 % | WEIGHT: 163 LBS | DIASTOLIC BLOOD PRESSURE: 60 MMHG | HEART RATE: 71 BPM | HEIGHT: 65 IN | SYSTOLIC BLOOD PRESSURE: 100 MMHG

## 2022-12-15 DIAGNOSIS — Z12.5 SCREENING FOR PROSTATE CANCER: ICD-10-CM

## 2022-12-15 DIAGNOSIS — K52.9 CHRONIC DIARRHEA: ICD-10-CM

## 2022-12-15 DIAGNOSIS — M79.605 LEG PAIN, BILATERAL: ICD-10-CM

## 2022-12-15 DIAGNOSIS — M79.604 LEG PAIN, BILATERAL: ICD-10-CM

## 2022-12-15 DIAGNOSIS — I10 ESSENTIAL HYPERTENSION: Primary | ICD-10-CM

## 2022-12-15 DIAGNOSIS — Z23 IMMUNIZATION DUE: ICD-10-CM

## 2022-12-15 DIAGNOSIS — E78.5 DYSLIPIDEMIA: ICD-10-CM

## 2022-12-15 DIAGNOSIS — F33.42 RECURRENT MAJOR DEPRESSIVE DISORDER, IN FULL REMISSION (HCC): Primary | ICD-10-CM

## 2022-12-15 DIAGNOSIS — F51.05 INSOMNIA DUE TO MENTAL DISORDER: ICD-10-CM

## 2022-12-15 DIAGNOSIS — R53.83 OTHER FATIGUE: ICD-10-CM

## 2022-12-15 DIAGNOSIS — N40.0 BENIGN PROSTATIC HYPERPLASIA WITHOUT LOWER URINARY TRACT SYMPTOMS: ICD-10-CM

## 2022-12-15 DIAGNOSIS — F41.1 GENERALIZED ANXIETY DISORDER: ICD-10-CM

## 2022-12-15 DIAGNOSIS — K21.9 GASTROESOPHAGEAL REFLUX DISEASE WITHOUT ESOPHAGITIS: ICD-10-CM

## 2022-12-15 PROCEDURE — 90694 VACC AIIV4 NO PRSRV 0.5ML IM: CPT | Performed by: FAMILY MEDICINE

## 2022-12-15 PROCEDURE — G8484 FLU IMMUNIZE NO ADMIN: HCPCS | Performed by: FAMILY MEDICINE

## 2022-12-15 PROCEDURE — 3074F SYST BP LT 130 MM HG: CPT | Performed by: PSYCHIATRY & NEUROLOGY

## 2022-12-15 PROCEDURE — G8427 DOCREV CUR MEDS BY ELIG CLIN: HCPCS | Performed by: PSYCHIATRY & NEUROLOGY

## 2022-12-15 PROCEDURE — G0008 ADMIN INFLUENZA VIRUS VAC: HCPCS | Performed by: FAMILY MEDICINE

## 2022-12-15 PROCEDURE — G8417 CALC BMI ABV UP PARAM F/U: HCPCS | Performed by: PSYCHIATRY & NEUROLOGY

## 2022-12-15 PROCEDURE — G8427 DOCREV CUR MEDS BY ELIG CLIN: HCPCS | Performed by: FAMILY MEDICINE

## 2022-12-15 PROCEDURE — G8484 FLU IMMUNIZE NO ADMIN: HCPCS | Performed by: PSYCHIATRY & NEUROLOGY

## 2022-12-15 PROCEDURE — 3017F COLORECTAL CA SCREEN DOC REV: CPT | Performed by: FAMILY MEDICINE

## 2022-12-15 PROCEDURE — 1123F ACP DISCUSS/DSCN MKR DOCD: CPT | Performed by: FAMILY MEDICINE

## 2022-12-15 PROCEDURE — 99214 OFFICE O/P EST MOD 30 MIN: CPT | Performed by: FAMILY MEDICINE

## 2022-12-15 PROCEDURE — 3078F DIAST BP <80 MM HG: CPT | Performed by: PSYCHIATRY & NEUROLOGY

## 2022-12-15 PROCEDURE — 99214 OFFICE O/P EST MOD 30 MIN: CPT | Performed by: PSYCHIATRY & NEUROLOGY

## 2022-12-15 PROCEDURE — 3078F DIAST BP <80 MM HG: CPT | Performed by: FAMILY MEDICINE

## 2022-12-15 PROCEDURE — 3017F COLORECTAL CA SCREEN DOC REV: CPT | Performed by: PSYCHIATRY & NEUROLOGY

## 2022-12-15 PROCEDURE — 1123F ACP DISCUSS/DSCN MKR DOCD: CPT | Performed by: PSYCHIATRY & NEUROLOGY

## 2022-12-15 PROCEDURE — 3074F SYST BP LT 130 MM HG: CPT | Performed by: FAMILY MEDICINE

## 2022-12-15 PROCEDURE — 4004F PT TOBACCO SCREEN RCVD TLK: CPT | Performed by: PSYCHIATRY & NEUROLOGY

## 2022-12-15 PROCEDURE — G8417 CALC BMI ABV UP PARAM F/U: HCPCS | Performed by: FAMILY MEDICINE

## 2022-12-15 PROCEDURE — 4004F PT TOBACCO SCREEN RCVD TLK: CPT | Performed by: FAMILY MEDICINE

## 2022-12-15 RX ORDER — SERTRALINE HYDROCHLORIDE 100 MG/1
100 TABLET, FILM COATED ORAL EVERY MORNING
Qty: 90 TABLET | Refills: 1 | Status: SHIPPED | OUTPATIENT
Start: 2022-12-15

## 2022-12-15 RX ORDER — ATENOLOL 25 MG/1
25 TABLET ORAL DAILY
Qty: 90 TABLET | Refills: 1 | Status: SHIPPED | OUTPATIENT
Start: 2022-12-15

## 2022-12-15 RX ORDER — TAMSULOSIN HYDROCHLORIDE 0.4 MG/1
0.4 CAPSULE ORAL DAILY
Qty: 90 CAPSULE | Refills: 1 | Status: SHIPPED | OUTPATIENT
Start: 2022-12-15

## 2022-12-15 RX ORDER — ROSUVASTATIN CALCIUM 40 MG/1
40 TABLET, COATED ORAL DAILY
Qty: 90 TABLET | Refills: 1 | Status: SHIPPED | OUTPATIENT
Start: 2022-12-15

## 2022-12-15 RX ORDER — BUSPIRONE HYDROCHLORIDE 5 MG/1
5 TABLET ORAL 2 TIMES DAILY
Qty: 180 TABLET | Refills: 1 | Status: SHIPPED | OUTPATIENT
Start: 2022-12-15

## 2022-12-15 RX ORDER — OMEPRAZOLE 40 MG/1
40 CAPSULE, DELAYED RELEASE ORAL DAILY
Qty: 90 CAPSULE | Refills: 1 | Status: SHIPPED | OUTPATIENT
Start: 2022-12-15

## 2022-12-15 RX ORDER — TRAZODONE HYDROCHLORIDE 100 MG/1
100 TABLET ORAL NIGHTLY
Qty: 90 TABLET | Refills: 1 | Status: SHIPPED | OUTPATIENT
Start: 2022-12-15

## 2022-12-15 SDOH — ECONOMIC STABILITY: FOOD INSECURITY: WITHIN THE PAST 12 MONTHS, THE FOOD YOU BOUGHT JUST DIDN'T LAST AND YOU DIDN'T HAVE MONEY TO GET MORE.: NEVER TRUE

## 2022-12-15 SDOH — ECONOMIC STABILITY: TRANSPORTATION INSECURITY
IN THE PAST 12 MONTHS, HAS LACK OF TRANSPORTATION KEPT YOU FROM MEETINGS, WORK, OR FROM GETTING THINGS NEEDED FOR DAILY LIVING?: NO

## 2022-12-15 SDOH — ECONOMIC STABILITY: FOOD INSECURITY: WITHIN THE PAST 12 MONTHS, YOU WORRIED THAT YOUR FOOD WOULD RUN OUT BEFORE YOU GOT MONEY TO BUY MORE.: NEVER TRUE

## 2022-12-15 SDOH — ECONOMIC STABILITY: TRANSPORTATION INSECURITY
IN THE PAST 12 MONTHS, HAS THE LACK OF TRANSPORTATION KEPT YOU FROM MEDICAL APPOINTMENTS OR FROM GETTING MEDICATIONS?: NO

## 2022-12-15 ASSESSMENT — PATIENT HEALTH QUESTIONNAIRE - PHQ9
SUM OF ALL RESPONSES TO PHQ QUESTIONS 1-9: 2
7. TROUBLE CONCENTRATING ON THINGS, SUCH AS READING THE NEWSPAPER OR WATCHING TELEVISION: 0
5. POOR APPETITE OR OVEREATING: 0
SUM OF ALL RESPONSES TO PHQ9 QUESTIONS 1 & 2: 0
6. FEELING BAD ABOUT YOURSELF - OR THAT YOU ARE A FAILURE OR HAVE LET YOURSELF OR YOUR FAMILY DOWN: 0
9. THOUGHTS THAT YOU WOULD BE BETTER OFF DEAD, OR OF HURTING YOURSELF: 0
SUM OF ALL RESPONSES TO PHQ QUESTIONS 1-9: 2
10. IF YOU CHECKED OFF ANY PROBLEMS, HOW DIFFICULT HAVE THESE PROBLEMS MADE IT FOR YOU TO DO YOUR WORK, TAKE CARE OF THINGS AT HOME, OR GET ALONG WITH OTHER PEOPLE: 0
8. MOVING OR SPEAKING SO SLOWLY THAT OTHER PEOPLE COULD HAVE NOTICED. OR THE OPPOSITE, BEING SO FIGETY OR RESTLESS THAT YOU HAVE BEEN MOVING AROUND A LOT MORE THAN USUAL: 0
4. FEELING TIRED OR HAVING LITTLE ENERGY: 2
3. TROUBLE FALLING OR STAYING ASLEEP: 0
SUM OF ALL RESPONSES TO PHQ QUESTIONS 1-9: 2
2. FEELING DOWN, DEPRESSED OR HOPELESS: 0
1. LITTLE INTEREST OR PLEASURE IN DOING THINGS: 0
SUM OF ALL RESPONSES TO PHQ QUESTIONS 1-9: 2

## 2022-12-15 ASSESSMENT — ANXIETY QUESTIONNAIRES
7. FEELING AFRAID AS IF SOMETHING AWFUL MIGHT HAPPEN: 0
2. NOT BEING ABLE TO STOP OR CONTROL WORRYING: 0
GAD7 TOTAL SCORE: 3
1. FEELING NERVOUS, ANXIOUS, OR ON EDGE: 1
IF YOU CHECKED OFF ANY PROBLEMS ON THIS QUESTIONNAIRE, HOW DIFFICULT HAVE THESE PROBLEMS MADE IT FOR YOU TO DO YOUR WORK, TAKE CARE OF THINGS AT HOME, OR GET ALONG WITH OTHER PEOPLE: NOT DIFFICULT AT ALL
6. BECOMING EASILY ANNOYED OR IRRITABLE: 0
5. BEING SO RESTLESS THAT IT IS HARD TO SIT STILL: 2
3. WORRYING TOO MUCH ABOUT DIFFERENT THINGS: 0
4. TROUBLE RELAXING: 0

## 2022-12-15 ASSESSMENT — LIFESTYLE VARIABLES
HOW OFTEN DO YOU HAVE A DRINK CONTAINING ALCOHOL: NEVER
HOW MANY STANDARD DRINKS CONTAINING ALCOHOL DO YOU HAVE ON A TYPICAL DAY: PATIENT DOES NOT DRINK

## 2022-12-15 ASSESSMENT — SOCIAL DETERMINANTS OF HEALTH (SDOH): HOW HARD IS IT FOR YOU TO PAY FOR THE VERY BASICS LIKE FOOD, HOUSING, MEDICAL CARE, AND HEATING?: NOT HARD AT ALL

## 2022-12-15 NOTE — PROGRESS NOTES
Patient:  Jay Rizzo  Age:  74 y.o.  :  1955     SEX:  male MRN:  [id number]     RACE:  /      SEEN:  [x]  PATIENT  []  SPOUSE []  OTHER:              PHQ-9  12/15/2022 9/15/2022 9/15/2022   Little interest or pleasure in doing things 0 0 0   Little interest or pleasure in doing things - - -   Feeling down, depressed, or hopeless 0 0 0   Trouble falling or staying asleep, or sleeping too much 0 1 -   Trouble falling or staying asleep, or sleeping too much - - -   Feeling tired or having little energy 2 1 -   Feeling tired or having little energy - - -   Poor appetite or overeating 0 0 -   Poor appetite, weight loss, or overeating - - -   Feeling bad about yourself - or that you are a failure or have let yourself or your family down 0 0 -   Feeling bad about yourself - or that you are a failure or have let yourself or your family down - - -   Trouble concentrating on things, such as reading the newspaper or watching television 0 0 -   Trouble concentrating on things such as school, work, reading, or watching TV - - -   Moving or speaking so slowly that other people could have noticed.  Or the opposite - being so fidgety or restless that you have been moving around a lot more than usual 0 0 -   Moving or speaking so slowly that other people could have noticed; or the opposite being so fidgety that others notice - - -   Thoughts that you would be better off dead, or of hurting yourself in some way 0 0 -   Thoughts of being better off dead, or hurting yourself in some way - - -   PHQ-2 Score 0 0 0   Total Score PHQ 2 - - -   PHQ-9 Total Score 2 2 0   PHQ 9 Score - - -   If you checked off any problems, how difficult have these problems made it for you to do your work, take care of things at home, or get along with other people? 0 0 -   How difficult have these problems made it for you to do your work, take care of your home and get along with others - - -       VIDA-7 SCREENING 12/15/2022 9/15/2022 6/16/2022   Feeling nervous, anxious, or on edge Several days Not at all Several days   Not being able to stop or control worrying Not at all Not at all Not at all   Worrying too much about different things Not at all Not at all Several days   Trouble relaxing Not at all Several days Not at all   Being so restless that it is hard to sit still More than half the days Several days Not at all   Becoming easily annoyed or irritable Not at all Not at all Not at all   Feeling afraid as if something awful might happen Not at all Not at all Not at all   VIDA-7 Total Score 3 2 2   How difficult have these problems made it for you to do your work, take care of things at home, or get along with other people? Not difficult at all Not difficult at all Not difficult at all   Feeling nervous, anxious, or on edge - - -   VIDA-7 Total Score - - -       Chief complaint:  Pt says doing the same. Subjective:  Seen in person with his care provider Mr. Polo Weaver. States he has been doing well on the current medications. Decreasing the dose of BuSpar to 5 mg twice a day is working well for him. Denies depression or anxiety. Denies side effects from the medications. Smokes a couple of cigarettes a day. Still using metal detector to find pennies and coins. Enjoys doing good. Supportive psychotherapy provided. He denies suicidal ideation/homicidal ideations. Denies symptoms of psychosis.        Patient Active Problem List   Diagnosis    GERD (gastroesophageal reflux disease)    Dark stools    Dizziness    Abnormal finding on MRI of brain    Prediabetes    COPD, moderate (HCC)    Bronchitis    BMI 28.0-28.9,adult    Mixed simple and mucopurulent chronic bronchitis (HCC)    Essential hypertension    Adjustment disorder with anxiety    Exercise counseling    Cough    Chronic back pain    Benign prostatic hyperplasia without lower urinary tract symptoms    Smoking    Dyslipidemia    Arthritis of left hand    Right elbow tendinitis    Sinusitis    Rectal bleeding    Moderate episode of recurrent major depressive disorder (HCC)    Overweight    Tobacco use    Gastroesophageal reflux disease without esophagitis    Atypical chest pain    Restless leg syndrome    Acute periumbilical pain    Lung nodule < 6cm on CT    Frequent urination    Depression    Urinary tract infection without hematuria    Left otitis media    Dietary counseling    Encounter for immunization    Hyperlipidemia    Generalized anxiety disorder    Chronic leg pain    Tobacco dependence    Allergic rhinitis    LAM (dyspnea on exertion)    Nonspecific abnormal finding in saliva    Chest wall pain    Angina at rest [facility])    Major depressive disorder, recurrent, in partial remission (HCC)    Agatston coronary artery calcium score less than 100    Angular cheilitis    Hypertriglyceridemia     Denies palpitation,SOB, Chest pain, headaches. In no acute distress.        MEDICATION REVIEW:    Current Medications:    Current Outpatient Medications   Medication Sig    atenolol (TENORMIN) 25 MG tablet Take 1 tablet by mouth daily    omeprazole (PRILOSEC) 40 MG delayed release capsule Take 1 capsule by mouth daily    rosuvastatin (CRESTOR) 40 MG tablet Take 1 tablet by mouth daily    tamsulosin (FLOMAX) 0.4 MG capsule Take 1 capsule by mouth daily    sertraline (ZOLOFT) 100 MG tablet Take 1 tablet by mouth every morning    traZODone (DESYREL) 100 MG tablet Take 1 tablet by mouth nightly    busPIRone (BUSPAR) 5 MG tablet Take 1 tablet by mouth 2 times daily    nystatin (MYCOSTATIN) 649209 UNIT/GM ointment Apply topically 2 times daily for 2 weeks    Multiple Vitamin (MULTIVITAMINS PO) Take 1 tablet by mouth daily    Omega-3 Fatty Acids (FISH OIL) 1200 MG CAPS Take 1 capsule by mouth daily Indications: 1000mg    ascorbic acid (VITAMIN C) 250 MG tablet Take 500 mg by mouth daily    aspirin 81 MG EC tablet Take 81 mg by mouth daily    ipratropium-albuterol (DUONEB) 0.5-2.5 (3) MG/3ML SOLN nebulizer solution Inhale 3 mLs into the lungs 3 times daily    rOPINIRole (REQUIP) 1 MG tablet Take 1 tablet by mouth nightly (Patient not taking: No sig reported)     No current facility-administered medications for this visit. No Known Allergies    Past Medical History, Past Surgical History, Family history, Social History, and Medications were all reviewed with the patient today and updated as necessary.      Compliant with medication: Yes   Side effects from medications:  No     EXAMINATION  Musculoskeletal    GAIT AND STATION   [x] WNL   [] RESTRICTED   [] UNSTEADY WALK        [] ABNORMAL   [] UNBALANCED         PSYCHIATRIC     GENERAL APPEARANCE:   [x]  WELL GROOMED []     DISHEVELED   []  UNKEMPT      []  UNUSUAL/BIZZARE    [] WNL       ATTITUDE:   [x] COOPERATIVE   [] GUARDED   [] SUSPICIOUS      [] HOSTILE                            BEHAVIOR:   [x] CALM   [] HYPERACTIVE   [] MANNERISMS      [] BIZZARE         SPEECH:   [x] NORMAL FOR CLIENT   [] SPONTANEOUS   [] SLURRED   [] WHISPERING      [] LOUD   [] PRESSURED   [] ARTICULATE       EYE CONTACT:   [x] WNL   [] BLANK STARE   [] INTENSE      [] AVOIDANT         MOOD:   [x] EUTHYMIC   [] ANXIOUS   [] DEPRESSED      [] IRRITABLE   [] ANGRY   [] APATHETIC     AFFECT:   [x] CONGRUENT WITH MOOD   [] FLAT   [] CONSTRICTED      [] INAPPROPRIATE   [] LABILE           THOUGHT PROCESS:   [x] LOGICAL/GOAL-DIRECTED   [] FOI   [] CIRCUMSANTIAL      [] INCOHERENT   [] TANGENTIAL   [] CONCRETE      [] PERSEVERATION           THOUGHT CONTENT:                DELUSIONS  [x] DENIES  [] GRANDIOSE  [] PERSECUTORY  [] Alevism  [] REFERENCE   HALLUCINATIONS  [x] DENIES  [] AUDITORY  [] VISUAL  [] OLFACTORY  [] TACTILE     [] GUSTATORY  [] SOMATIC         OBSESSIONS  [x] DENIES  [] PRESENT         SUICIDAL IDEATION  [x] DENIES  [] PRESENT W/O PLAN  [] PRESENT W/ PLAN       HOMICIDAL IDEATION  [x] DENIES  [] PRESENT W/O PLAN  [] PRESENT W/ PLAN JUDGEMENT:   [x] GOOD   [] FAIR   [] POOR   INSIGHT:   [x] GOOD   [] FAIR   [] POOR     COGNITION:           SENSORIUM:   [x] ALERT   [] CLOUDED   [] DROWSY     ORIENTATION:   [x] INTACT   [] TIME:  PLACE  PERSON   RECENT & REMOTE MEMORY:   [] NORMAL   [x] OTHER:                  ATTENTION:   [x] INTACT   [] MILD IMPAIRMENT   [] SEVERE IMPAIRMENT     CONCENTRATION:   [x] INTACT   [] MILD IMPAIRMENT   [] SEVERE IMPAIRMENT     LANGUAGE:   [x] AVERAGE   [] ABOVE AVERAGE   [] BELOW AVERAGE     FUND OF KNOWLEDGE:   [] UNABLE TO ASSESS AT THIS TIME   [x] AVERAGE   [] ABOVE AVERAGE   [] BELOW AVERAGE      [] GOOD TO EXCELLENT KNOWLEDGE OF CURRENT EVENTS   [] POOR TO NO KNLEDGE OF CURRENT EVENTS           ABNORMAL MOVEMENTS:   [x] NONE   [] TICS   [] TREMORS   [] BIZZARE      [] FACE   [] TRUNK   [] EXTREMETIES   [] GESTURES        SLEEP:   [x] GOOD   [] FAIR   [] POOR      MUSCLE STRENGTH AND TONE   [] WNL   [] ATROPHY   [] SPASTIC        [] FLACCID   [] COGWHEEL         Diagnoses/Impressions:    ICD-10-CM    1. Recurrent major [facility]  F33.42       2. Generalized anxiety disorder  F41.1       3.  Insomnia due to mental disorder  F51.05           TREATMENT GOALS:  Symptom reduction, Medication adherence, maintain therapeutic gains    LABS/IMAGING:    []  Ordered [x]  Reviewed []  New Labs Ordered:     LAB  WBC   Date/Time Value Ref Range Status   08/16/2022 10:14 AM 6.7 4.3 - 11.1 K/uL Final     Hemoglobin   Date/Time Value Ref Range Status   08/16/2022 10:14 AM 14.4 13.6 - 17.2 g/dL Final     Hematocrit   Date/Time Value Ref Range Status   08/16/2022 10:14 AM 41.6 41.1 - 50.3 % Final     Platelets   Date/Time Value Ref Range Status   08/16/2022 10:14  150 - 450 K/uL Final     Sodium   Date/Time Value Ref Range Status   08/16/2022 10:14  138 - 145 mmol/L Final     Potassium   Date/Time Value Ref Range Status   08/16/2022 10:14 AM 4.5 3.5 - 5.1 mmol/L Final     Chloride Date/Time Value Ref Range Status   08/16/2022 10:14  (H) 98 - 107 mmol/L Final     CO2   Date/Time Value Ref Range Status   08/16/2022 10:14 AM 28 21 - 32 mmol/L Final     BUN   Date/Time Value Ref Range Status   08/16/2022 10:14 AM 14 8 - 23 MG/DL Final     ALT   Date/Time Value Ref Range Status   03/15/2022 12:02 PM 36 12 - 65 U/L Final     AST   Date/Time Value Ref Range Status   03/15/2022 12:02 PM 22 15 - 37 U/L Final       Please refer to the lab tab in the epic and care everywhere for the most recent lab results. Plan:     [x]  Medication ordered: Trazodone, BuSpar, Zoloft to target depression, anxiety, insomnia. [x]  Medication education/counseling provided  Medication dosage and time to take, purpose/expected benefits/risks, common side effects, lab monitoring required and reason, expected length of treatment, risk of no treatment, effects on pregnancy/nursing, financial availability. Educated patient on  side effects/risks/benefits of meds including cardiac arrhythmias, suicidal ideations, priapism, orthostatic hypotension, serotonin syndrome, risk of blake/hypomania from antidepressants, withdrawals from abrupt discontinuation of meds, risk of bleeding, risk of seizures, high blood pressure, dizziness, drowsiness, sedation , risk of falls, Risk & benefits discussed: including but not limited to possible off-label medication usage. [x] Follow [facility] for sxs improvement     I have reviewed the patients controlled substance prescription history, as maintained in the [place] prescription monitoring program, so that the prescription(s) for a  controlled substance can be given. Recommendations and Referrals: Follow up with : MD, requires monitoring of response to medication, requires monitoring of medication side effects.     Time until next PMA:     Follow up with Mental Health Clinicians: psychotherapy interventions, improve level of functioning, monitoring to prevent decompensation /hospitalization, monitoring to maintain therapeutic gains, symptoms (resolving and controlled)           Psychotherapy note:                                __10_ Minutes of psychotherapy     [x]  Supportive psychotherapy, Patient discussed certain situational and personal stressors ongoing in his life at this time, weight management d/w the patient. Sleep hygiene d/w patient. Patient allowed to vent out his emotions. Scenarios were reviewed using role playing and CBT techniques in order to increase insight and decrease anxiety. []  Disposition planning      []  Dangerous and will not contract for safety in the community    **Pateint has been notified: They are to call 911 or go to their nearest E.R. if they are experiencing a medical emergency or suicidal ideations/homicidal ideations. **  All ancillary documentation entered reviewed by provider. PLEASE NOTE:  This document has been produced in part or whole using voice recognition software. Proofread however unrecognized errors in transcription may be present.         Sam Andrade MD

## 2022-12-16 PROBLEM — R53.83 OTHER FATIGUE: Status: ACTIVE | Noted: 2022-12-16

## 2022-12-16 PROBLEM — M79.604 LEG PAIN, BILATERAL: Status: ACTIVE | Noted: 2022-12-16

## 2022-12-16 PROBLEM — K52.9 CHRONIC DIARRHEA: Status: ACTIVE | Noted: 2022-12-16

## 2022-12-16 PROBLEM — Z12.5 SCREENING FOR PROSTATE CANCER: Status: ACTIVE | Noted: 2022-12-16

## 2022-12-16 PROBLEM — M79.605 LEG PAIN, BILATERAL: Status: ACTIVE | Noted: 2022-12-16

## 2022-12-16 ASSESSMENT — ENCOUNTER SYMPTOMS
GASTROINTESTINAL NEGATIVE: 1
ALLERGIC/IMMUNOLOGIC NEGATIVE: 1
EYES NEGATIVE: 1

## 2022-12-16 NOTE — PROGRESS NOTES
[address] [facility], [facility] [address]  Office : [phone]  Fax : [phone]      Subjective: The patient is a 74 y.o. male  who presents for f/u on multiple chronic medical conditions-good compliance with medications--pt here to get routeine labs and need refill on meds. no cardiopulmonary symptoms  Pt lives with his long standing family friend who is care giver for this pt  pts family lives outside [place]  Pt accompanied by his friend   Hypertension--Pt BP been controlled with meds-LOW LAST FEW DAYS-pt asymptomatic  Prediabetes -pts blood sugar stable on med  Hyperlipidemia--pts on low carb diet and low fat diet -stable on med  Gerd -stable on diet /med  COPD--Pt seeing pulmonologist regularly  ANXIETY/DEPRESSION -WELL CONTROLLED ON MED--sees psychiatrist  Pt seen GI for GI bleed--all work up was negative-has gastritis-on PPI  Allergic rhinitis-likely from smoking  And environmental  Hx of ANGINA--on and off symptoms-had stress test in 2016-was normal--lately last 3 months more symptoms-uses nitro as needed-pt was seen by cardiologist in 9/2022-cardiac cath was NORMAL    Pt also c/of a month hx of on and off loose stool-no blood or dark color-pt seen GI -scope was normal-pt interested in labs/stool studies-no abd pain  Pt also c/of bilateral lower extremity pain and discomfort  for few months-on  a nd off-no lowback pain--no fall or injury--hx of smoking    Patient Active Problem List   Diagnosis    GERD (gastroesophageal reflux disease)    Dark stools    Dizziness    Abnormal finding on MRI of brain    Prediabetes    COPD, moderate (HCC)    Bronchitis    BMI 28.0-28.9,adult    Mixed simple and mucopurulent chronic bronchitis ([address]    Essential hypertension    Adjustment disorder with anxiety    Exercise counseling    Cough    Chronic back pain    Benign prostatic hyperplasia without lower urinary tract symptoms    Smoking    Dyslipidemia    Arthritis of left hand    Right elbow tendinitis    Sinusitis    Rectal bleeding    Moderate episode of recurrent major depressive disorder (HCC)    Overweight    Tobacco use    Gastroesophageal reflux disease without esophagitis    Atypical chest pain    Restless leg syndrome    Acute periumbilical pain    Lung nodule < 6cm on CT    Frequent urination    Depression    Urinary tract infection without hematuria    Left otitis media    Dietary counseling    Encounter for immunization    Hyperlipidemia    Generalized anxiety disorder    Chronic leg pain    Tobacco dependence    Allergic rhinitis    LAM (dyspnea on exertion)    Nonspecific abnormal finding in saliva    Chest wall pain    Angina at rest [facility])    Major depressive disorder, recurrent, in partial remission (HCC)    Agatston coronary artery calcium score less than 100    Angular cheilitis    Hypertriglyceridemia    Chronic diarrhea    Leg pain, bilateral    Other fatigue    Screening for prostate cancer       Past Medical History:   Diagnosis Date    Angina pectoris ([address] 11/17/2016    Anxiety 11/17/2016    Arm pain 11/17/2016    Asthma     controlled with prn medication. Last exacerbation March 2011    BPH (benign prostatic hyperplasia) 11/17/2016    Chest pain 11/17/2016    -daily  OV: 4/25/13 He continues to have substernal pressure with ex., relieved with rest. Stress test was non-dx. Cholelithiasis without cholecystitis 11/04/2010    Chronic airway obstruction, not elsewhere classified 9/21/2010    Chronic back pain 11/17/2016    Chronic leg pain 11/17/2016    Depression     Dyslipidemia 11/17/2016    Atherogenic Dyslipidemia/Hyperlipidemia    Generalized anxiety disorder 11/17/2016    GERD (gastroesophageal reflux disease)     controlled with med. History of kidney stones     HTN (hypertension) 11/17/2016    Hypercholesterolemia     controlled with medication    Hypertension     controlled with meds.     Ill-defined condition     Break Out on Lt Leg    Insomnia secondary to anxiety 2016    Major depressive disorder, recurrent ([facility] 2016    Petechiae 2016    Rectal bleeding 2016    SOB (shortness of breath) 2019    Tobacco dependence 2016    Tobacco use disorder 2016       Past Surgical History:   Procedure Laterality Date    CARDIAC PROCEDURE N/A 2022    Left heart cath / coronary angiography performed by Catherine Vegas MD at [address] [place] [address] CATH LAB    CHOLECYSTECTOMY      HERNIA REPAIR  age 12 yrs    HERNIA REPAIR Bilateral     HERNIA REPAIR  age 17 yrs    LITHOTRIPSY  late        Social History     Socioeconomic History    Marital status: Single     Spouse name: Not on file    Number of children: Not on file    Years of education: Not on file    Highest education level: Not on file   Occupational History    Not on file   Tobacco Use    Smoking status: Every Day     Packs/day: 0.25     Years: 46.00     Pack years: 11.50     Types: Cigarettes    Smokeless tobacco: Never    Tobacco comments:     Quit smokin-2 cig per day   Vaping Use    Vaping Use: Never used   Substance and Sexual Activity    Alcohol use: No    Drug use: No     Types: Prescription, OTC    Sexual activity: Not on file   Other Topics Concern    Not on file   Social History Narrative    Not on file     Social Determinants of Health     Financial Resource Strain: Low Risk     Difficulty of Paying Living Expenses: Not hard at all   Food Insecurity: No Food Insecurity    Worried About Running Out of Food in the Last Year: Never true    Ran Out of Food in the Last Year: Never true   Transportation Needs: No Transportation Needs    Lack of Transportation (Medical): No    Lack of Transportation (Non-Medical):  No   Physical Activity: Not on file   Stress: Not on file   Social Connections: Not on file   Intimate Partner Violence: Not on file   Housing Stability: Not on file       No Known Allergies    Current Outpatient Medications   Medication Sig Dispense Refill    atenolol (TENORMIN) 25 MG tablet Take 1 tablet by mouth daily 90 tablet 1    omeprazole (PRILOSEC) 40 MG delayed release capsule Take 1 capsule by mouth daily 90 capsule 1    rosuvastatin (CRESTOR) 40 MG tablet Take 1 tablet by mouth daily 90 tablet 1    tamsulosin (FLOMAX) 0.4 MG capsule Take 1 capsule by mouth daily 90 capsule 1    Multiple Vitamin (MULTIVITAMINS PO) Take 1 tablet by mouth daily      Omega-3 Fatty Acids (FISH OIL) 1200 MG CAPS Take 1 capsule by mouth daily Indications: 1000mg      ascorbic acid (VITAMIN C) 250 MG tablet Take 500 mg by mouth daily      aspirin 81 MG EC tablet Take 81 mg by mouth daily      ipratropium-albuterol (DUONEB) 0.5-2.5 (3) MG/3ML SOLN nebulizer solution Inhale 3 mLs into the lungs 3 times daily      sertraline (ZOLOFT) 100 MG tablet Take 1 tablet by mouth every morning 90 tablet 1    traZODone (DESYREL) 100 MG tablet Take 1 tablet by mouth nightly 90 tablet 1    busPIRone (BUSPAR) 5 MG tablet Take 1 tablet by mouth 2 times daily 180 tablet 1    rOPINIRole (REQUIP) 1 MG tablet Take 1 tablet by mouth nightly (Patient not taking: No sig reported) 90 tablet 1    nystatin (MYCOSTATIN) 237777 UNIT/GM ointment Apply topically 2 times daily for 2 weeks 60 g 0     No current facility-administered medications for this visit. Review of Systems   Constitutional:  Positive for fatigue. HENT: Negative. Eyes: Negative. Gastrointestinal: Negative. Endocrine: Negative. Genitourinary: Negative. Musculoskeletal: Negative. Skin: Negative. Allergic/Immunologic: Negative. Neurological: Negative. Hematological: Negative. Psychiatric/Behavioral: Negative. Objective:    BP (!) 110/54 (Site: Left Upper Arm, Position: Sitting, Cuff Size: Large Adult)   Pulse 76   Temp 98.3 °F (36.8 °C) (Temporal)   Ht 5' 5\" (1.651 m)   Wt 163 lb (73.9 kg)   SpO2 96%   BMI 27.12 kg/m²     Physical Exam  Constitutional:       Appearance: Normal appearance.    HENT: Head: Normocephalic and atraumatic. Right Ear: External ear normal.      Left Ear: External ear normal.      Nose: Nose normal.      Mouth/Throat:      Mouth: Mucous membranes are moist.      Pharynx: Oropharynx is clear. Comments: Dry angle of mouth  Eyes:      Extraocular Movements: Extraocular movements intact. Conjunctiva/sclera: Conjunctivae normal.      Pupils: Pupils are equal, round, and reactive to light. Cardiovascular:      Rate and Rhythm: Normal rate and regular rhythm. Pulmonary:      Effort: Pulmonary effort is normal.      Breath sounds: Normal breath sounds. Abdominal:      General: Bowel sounds are normal.      Palpations: Abdomen is soft. Musculoskeletal:         General: Normal range of motion. Cervical back: Normal range of motion and neck supple. Skin:     General: Skin is warm. Neurological:      General: No focal deficit present. Mental Status: He is alert and oriented to person, place, and time. Psychiatric:         Mood and Affect: Mood normal.         Behavior: Behavior normal.         Thought Content: Thought content normal.         Judgment: Judgment normal.          ASSESSMENT/PLAN:    1. Essential hypertension  Overview:  Stable with med  Stress test 2016=normal  Refilled  Labs   Annual eye exam recommended  F/u in 3 months  9/2022  BP low  Advised to take atenolol 25 mg only 1/2 pill daily  Keep BP log and call in 2 weeks    Orders:  -     atenolol (TENORMIN) 25 MG tablet; Take 1 tablet by mouth daily, Disp-90 tablet, R-1Normal  -     Comprehensive Metabolic Panel; Future  -     CBC with Auto Differential; Future  -     Vitamin B12; Future  -     Vitamin D 25 Hydroxy; Future  -     Lipid Panel; Future  -     TSH; Future  -     T4, Free; Future  -     Culture, Stool; Future  -     Ova and Parasite Examination; Future  -     Clostridium Difficile Toxin/Antigen; Future  -     PSA Screening; Future  2.  Immunization due  -     Influenza, FLUAD, (age 65 y+), IM, PF, 0.5 mL  -     Comprehensive Metabolic Panel; Future  -     CBC with Auto Differential; Future  -     Vitamin B12; Future  -     Vitamin D 25 Hydroxy; Future  -     Lipid Panel; Future  -     TSH; Future  -     T4, Free; Future  -     Culture, Stool; Future  -     Ova and Parasite Examination; Future  -     Clostridium Difficile Toxin/Antigen; Future  -     PSA Screening; Future  3. Gastroesophageal reflux disease without esophagitis  Overview:  Pt seen GI for GI bleed--all work up was negative-has gastritis-on PPIomeprazole  Orders:  -     omeprazole (PRILOSEC) 40 MG delayed release capsule; Take 1 capsule by mouth daily, Disp-90 capsule, R-1Normal  -     Comprehensive Metabolic Panel; Future  -     CBC with Auto Differential; Future  -     Vitamin B12; Future  -     Vitamin D 25 Hydroxy; Future  -     Lipid Panel; Future  -     TSH; Future  -     T4, Free; Future  -     Culture, Stool; Future  -     Ova and Parasite Examination; Future  -     Clostridium Difficile Toxin/Antigen; Future  -     PSA Screening; Future  4. Dyslipidemia  Overview: On statin      Orders:  -     rosuvastatin (CRESTOR) 40 MG tablet; Take 1 tablet by mouth daily, Disp-90 tablet, R-1Normal  -     Comprehensive Metabolic Panel; Future  -     CBC with Auto Differential; Future  -     Vitamin B12; Future  -     Vitamin D 25 Hydroxy; Future  -     Lipid Panel; Future  -     TSH; Future  -     T4, Free; Future  -     Culture, Stool; Future  -     Ova and Parasite Examination; Future  -     Clostridium Difficile Toxin/Antigen; Future  -     PSA Screening; Future  5. Benign prostatic hyperplasia without lower urinary tract symptoms  Overview:  Urologist  Last PSA 6/2019--normal      Orders:  -     tamsulosin (FLOMAX) 0.4 MG capsule; Take 1 capsule by mouth daily, Disp-90 capsule, R-1Normal  -     Comprehensive Metabolic Panel;  Future  -     CBC with Auto Differential; Future  -     Vitamin B12; Future  -     Vitamin D 25 Hydroxy; Future  -     Lipid Panel; Future  -     TSH; Future  -     T4, Free; Future  -     Culture, Stool; Future  -     Ova and Parasite Examination; Future  -     Clostridium Difficile Toxin/Antigen; Future  -     PSA Screening; Future  6. Chronic diarrhea  Comments:  likely IBS  fiber supplement  colonoscopy normal  labs ordered  Overview:  likely IBS  fiber supplement  colonoscopy normal  labs ordered    Orders:  -     Comprehensive Metabolic Panel; Future  -     CBC with Auto Differential; Future  -     Vitamin B12; Future  -     Vitamin D 25 Hydroxy; Future  -     Lipid Panel; Future  -     TSH; Future  -     T4, Free; Future  -     Culture, Stool; Future  -     Ova and Parasite Examination; Future  -     Clostridium Difficile Toxin/Antigen; Future  -     PSA Screening; Future  7. Leg pain, bilateral  Comments:  neuropathy likely  labs  us to r/o PAD  Overview:  neuropathy likely  labs  us to r/o PAD    Orders:  -     Comprehensive Metabolic Panel; Future  -     CBC with Auto Differential; Future  -     Vitamin B12; Future  -     Vitamin D 25 Hydroxy; Future  -     Lipid Panel; Future  -     TSH; Future  -     T4, Free; Future  -     Culture, Stool; Future  -     Ova and Parasite Examination; Future  -     Clostridium Difficile Toxin/Antigen; Future  -     PSA Screening; Future  -     Vascular duplex lower extremity arteries bilateral; Future  8. Other fatigue  Comments:  work up ordered  Overview:  work up ordered    Orders:  -     Comprehensive Metabolic Panel; Future  -     CBC with Auto Differential; Future  -     Vitamin B12; Future  -     Vitamin D 25 Hydroxy; Future  -     Lipid Panel; Future  -     TSH; Future  -     T4, Free; Future  -     Culture, Stool; Future  -     Ova and Parasite Examination; Future  -     Clostridium Difficile Toxin/Antigen; Future  -     PSA Screening; Future  -     Vascular duplex lower extremity arteries bilateral; Future  9.  Screening for prostate cancer  -     Comprehensive Metabolic Panel; Future  -     CBC with Auto Differential; Future  -     Vitamin B12; Future  -     Vitamin D 25 Hydroxy; Future  -     Lipid Panel; Future  -     TSH; Future  -     T4, Free; Future  -     Culture, Stool; Future  -     Ova and Parasite Examination; Future  -     Clostridium Difficile Toxin/Antigen; Future  -     PSA Screening;  Future         Orders Placed This Encounter   Procedures    Culture, Stool     Standing Status:   Future     Standing Expiration Date:   12/15/2023    Ova and Parasite Examination     Standing Status:   Future     Standing Expiration Date:   12/15/2023    Clostridium Difficile Toxin/Antigen     Standing Status:   Future     Standing Expiration Date:   12/15/2023    Influenza, FLUAD, (age 72 y+), IM, PF, 0.5 mL    Comprehensive Metabolic Panel     Standing Status:   Future     Standing Expiration Date:   12/15/2023    CBC with Auto Differential     Standing Status:   Future     Standing Expiration Date:   12/15/2023    Vitamin B12     Standing Status:   Future     Standing Expiration Date:   12/15/2023    Vitamin D 25 Hydroxy     Standing Status:   Future     Standing Expiration Date:   12/15/2023    Lipid Panel     Standing Status:   Future     Standing Expiration Date:   12/15/2023     Order Specific Question:   Is Patient Fasting?/# of Hours     Answer:   0    TSH     Standing Status:   Future     Standing Expiration Date:   12/15/2023    T4, Free     Standing Status:   Future     Standing Expiration Date:   12/15/2023    PSA Screening     Standing Status:   Future     Standing Expiration Date:   12/15/2023    Vascular duplex lower extremity arteries bilateral     Standing Status:   Future     Standing Expiration Date:   12/16/2023          Orders Placed This Encounter   Medications    atenolol (TENORMIN) 25 MG tablet     Sig: Take 1 tablet by mouth daily     Dispense:  90 tablet     Refill:  1    omeprazole (PRILOSEC) 40 MG delayed release capsule     Sig: Take 1 capsule by mouth daily     Dispense:  90 capsule     Refill:  1    rosuvastatin (CRESTOR) 40 MG tablet     Sig: Take 1 tablet by mouth daily     Dispense:  90 tablet     Refill:  1    tamsulosin (FLOMAX) 0.4 MG capsule     Sig: Take 1 capsule by mouth daily     Dispense:  90 capsule     Refill:  1          No results found for any visits on 12/15/22. Lab Results   Component Value Date     08/16/2022    K 4.5 08/16/2022     (H) 08/16/2022    CO2 28 08/16/2022    BUN 14 08/16/2022    CREATININE 0.90 08/16/2022    GLUCOSE 113 (H) 08/16/2022    CALCIUM 9.2 08/16/2022    PROT 7.4 03/15/2022    LABALBU 3.8 03/15/2022    BILITOT 0.6 03/15/2022    ALKPHOS 92 03/15/2022    AST 22 03/15/2022    ALT 36 03/15/2022    LABGLOM >60 08/16/2022    GFRAA >60 08/16/2022    AGRATIO 1.1 (L) 03/15/2022    GLOB 3.6 (H) 03/15/2022     Lab Results   Component Value Date    WBC 6.7 08/16/2022    HGB 14.4 08/16/2022    HCT 41.6 08/16/2022    MCV 91.2 08/16/2022     08/16/2022     Lab Results   Component Value Date    LABA1C 5.3 03/15/2022     Lab Results   Component Value Date     03/15/2022     Lab Results   Component Value Date    CHOL 131 03/15/2022    CHOL 152 05/07/2021    CHOL 144 10/27/2020     Lab Results   Component Value Date    TRIG 148 03/15/2022    TRIG 351 (H) 05/07/2021    TRIG 253 (H) 10/27/2020     Lab Results   Component Value Date    HDL 42 03/15/2022    HDL 40 05/07/2021    HDL 38 (L) 10/27/2020     Lab Results   Component Value Date    LDLCALC 59.4 03/15/2022    LDLCALC 41.8 05/07/2021    LDLCALC 55.4 10/27/2020     Lab Results   Component Value Date    LABVLDL 29.6 (H) 03/15/2022    LABVLDL 70.2 (H) 05/07/2021    LABVLDL 50.6 (H) 10/27/2020     Lab Results   Component Value Date    CHOLHDLRATIO 3.1 03/15/2022    CHOLHDLRATIO 3.8 05/07/2021    CHOLHDLRATIO 3.8 10/27/2020       CT Result (most recent):  CT CARDIAC OVER READ 08/03/2022    Narrative  CT CORONARY ANGIOGRAPHY WITH CONTRAST.     INDICATION: Chest pain. TECHNIQUE: 2.5 mm noncontrast axial scans with small field-of-view centered  about the heart were followed by 75 cc contrast intravenously and 0.625 mm axial  scans through the heart. Radiation dose reduction techniques were used for this  study. Our CT scanners use one or more of the following:  Automated exposure  control, adjustment of the mA and or kV according to patient size, iterative  reconstruction. FINDINGS: See cardiologist's report regarding the coronary arteries. Within the included portion of the thorax and abdomen:  No pulmonary nodules, air space disease or pleural effusion. No evidence of aortic dissection. Central pulmonary vessels are unremarkable. No gross abnormality in the included portion of the upper abdomen. No gross bony lesions. Impression  Unremarkable limited extracardiac CT chest.  See cardiologist's report regarding the coronary arteries. US Result (most recent):  US ROSAMARIA LUAN REST AND POST TREAD     Impression  Stress ROSAMARIA:  Upper extremity pressure is 136 mmHg. Right ankle pressure is 145   MmHG. Pressure involving right dorsalis pedis artery is 150 mmHg. Left ankle pressure is 140 mmHg. Pressure involving left dorsalis pedis artery is 150 mmHg. ROSAMARIA on   right side is   1.06. ROSAMARIA on left side is 1.02. Normal multiphasic wave pattern is seen on both side. After 3 minutes of exercise upper extremity pressure is 152 mmHg. Right ankle pressure is 158   MmHG. Pressure involving the right dorsalis pedis artery is 159 mmHg. Left ankle pressure is   159     MmHg. Pressure involving left dorsalis pedis artery is 152 mmHg. Normal multiphasic wave pattern is noted. Stress ROSAMARIA on right side is 1.03. Stress ROSAMARIA on the left side is 1.04. Conclusion: Stress ROSAMARIA on   right side is normal.  Stress ROSAMARIA on the left side is normal.    Electronically signed by:  Charlee Charles MD    Disclaimer:  The images associated with this test are located within a 3rd party PACs. F/u on test results  F/u as needed    We discussed the expected course, resolution and complications of the diagnosis(es) in detail. Medication risks, benefits, costs, interactions, and alternatives were discussed as indicated. I advised her to contact the office if her condition worsens, changes or fails to improve as anticipated. She expressed understanding with the diagnosis(es) and plan. Return in about 6 months (around 6/15/2023).      Shabana Carias MD

## 2022-12-21 ENCOUNTER — HOSPITAL ENCOUNTER (OUTPATIENT)
Dept: ULTRASOUND IMAGING | Age: 67
Discharge: HOME | End: 2022-12-24
Payer: MEDICARE

## 2022-12-21 DIAGNOSIS — K52.9 CHRONIC DIARRHEA: ICD-10-CM

## 2022-12-21 DIAGNOSIS — M79.604 LEG PAIN, BILATERAL: ICD-10-CM

## 2022-12-21 DIAGNOSIS — M79.605 LEG PAIN, BILATERAL: ICD-10-CM

## 2022-12-21 DIAGNOSIS — Z12.5 SCREENING FOR PROSTATE CANCER: ICD-10-CM

## 2022-12-21 DIAGNOSIS — K21.9 GASTROESOPHAGEAL REFLUX DISEASE WITHOUT ESOPHAGITIS: ICD-10-CM

## 2022-12-21 DIAGNOSIS — Z23 IMMUNIZATION DUE: ICD-10-CM

## 2022-12-21 DIAGNOSIS — E78.5 DYSLIPIDEMIA: ICD-10-CM

## 2022-12-21 DIAGNOSIS — I10 ESSENTIAL HYPERTENSION: ICD-10-CM

## 2022-12-21 DIAGNOSIS — R53.83 OTHER FATIGUE: ICD-10-CM

## 2022-12-21 DIAGNOSIS — N40.0 BENIGN PROSTATIC HYPERPLASIA WITHOUT LOWER URINARY TRACT SYMPTOMS: ICD-10-CM

## 2022-12-21 LAB
25(OH)D3 SERPL-MCNC: 57.9 NG/ML (ref 30–100)
ALBUMIN SERPL-MCNC: 4 G/DL (ref 3.2–4.6)
ALBUMIN/GLOB SERPL: 1.1 (ref 0.4–1.6)
ALP SERPL-CCNC: 113 U/L (ref 50–136)
ALT SERPL-CCNC: 36 U/L (ref 12–65)
ANION GAP SERPL CALC-SCNC: 6 MMOL/L (ref 2–11)
AST SERPL-CCNC: 20 U/L (ref 15–37)
BASOPHILS # BLD: 0.1 K/UL (ref 0–0.2)
BASOPHILS NFR BLD: 1 % (ref 0–2)
BILIRUB SERPL-MCNC: 0.6 MG/DL (ref 0.2–1.1)
BUN SERPL-MCNC: 16 MG/DL (ref 8–23)
CALCIUM SERPL-MCNC: 8.7 MG/DL (ref 8.3–10.4)
CHLORIDE SERPL-SCNC: 105 MMOL/L (ref 101–110)
CHOLEST SERPL-MCNC: 126 MG/DL
CO2 SERPL-SCNC: 27 MMOL/L (ref 21–32)
CREAT SERPL-MCNC: 0.9 MG/DL (ref 0.8–1.5)
DIFFERENTIAL METHOD BLD: NORMAL
EOSINOPHIL # BLD: 0.3 K/UL (ref 0–0.8)
EOSINOPHIL NFR BLD: 4 % (ref 0.5–7.8)
ERYTHROCYTE [DISTWIDTH] IN BLOOD BY AUTOMATED COUNT: 12.3 % (ref 11.9–14.6)
GLOBULIN SER CALC-MCNC: 3.5 G/DL (ref 2.8–4.5)
GLUCOSE SERPL-MCNC: 123 MG/DL (ref 65–100)
HCT VFR BLD AUTO: 46 % (ref 41.1–50.3)
HDLC SERPL-MCNC: 38 MG/DL (ref 40–60)
HDLC SERPL: 3.3
HGB BLD-MCNC: 16 G/DL (ref 13.6–17.2)
IMM GRANULOCYTES # BLD AUTO: 0 K/UL (ref 0–0.5)
IMM GRANULOCYTES NFR BLD AUTO: 0 % (ref 0–5)
LDLC SERPL CALC-MCNC: 67.6 MG/DL
LYMPHOCYTES # BLD: 2.1 K/UL (ref 0.5–4.6)
LYMPHOCYTES NFR BLD: 27 % (ref 13–44)
MCH RBC QN AUTO: 31.8 PG (ref 26.1–32.9)
MCHC RBC AUTO-ENTMCNC: 34.8 G/DL (ref 31.4–35)
MCV RBC AUTO: 91.5 FL (ref 82–102)
MONOCYTES # BLD: 0.5 K/UL (ref 0.1–1.3)
MONOCYTES NFR BLD: 6 % (ref 4–12)
NEUTS SEG # BLD: 4.8 K/UL (ref 1.7–8.2)
NEUTS SEG NFR BLD: 62 % (ref 43–78)
NRBC # BLD: 0 K/UL (ref 0–0.2)
PLATELET # BLD AUTO: 224 K/UL (ref 150–450)
PMV BLD AUTO: 9.6 FL (ref 9.4–12.3)
POTASSIUM SERPL-SCNC: 4.7 MMOL/L (ref 3.5–5.1)
PROT SERPL-MCNC: 7.5 G/DL (ref 6.3–8.2)
RBC # BLD AUTO: 5.03 M/UL (ref 4.23–5.6)
SODIUM SERPL-SCNC: 138 MMOL/L (ref 133–143)
T4 FREE SERPL-MCNC: 0.8 NG/DL (ref 0.78–1.46)
TRIGL SERPL-MCNC: 102 MG/DL (ref 35–150)
TSH, 3RD GENERATION: 1.19 UIU/ML (ref 0.36–3.74)
VIT B12 SERPL-MCNC: 551 PG/ML (ref 193–986)
VLDLC SERPL CALC-MCNC: 20.4 MG/DL (ref 6–23)
WBC # BLD AUTO: 7.8 K/UL (ref 4.3–11.1)

## 2022-12-21 PROCEDURE — 93925 LOWER EXTREMITY STUDY: CPT

## 2022-12-22 LAB — PSA SERPL-MCNC: 2.9 NG/ML

## 2022-12-23 LAB
C DIFF GDH STL QL: NORMAL
C DIFF TOX A+B STL QL IA: NORMAL
C DIFF TOXIN INTERPRETATION: NORMAL
CLINICAL CONSIDERATION: NORMAL
REFLEX: NORMAL

## 2022-12-25 LAB
BACTERIA SPEC CULT: NORMAL
SERVICE CMNT-IMP: NORMAL

## 2022-12-27 ENCOUNTER — HOSPITAL ENCOUNTER (OUTPATIENT)
Dept: CT IMAGING | Age: 67
Discharge: HOME | End: 2022-12-30
Payer: MEDICARE

## 2022-12-27 DIAGNOSIS — R91.8 PULMONARY NODULES: ICD-10-CM

## 2022-12-27 PROCEDURE — 71250 CT THORAX DX C-: CPT

## 2022-12-28 LAB
O+P SPEC MICRO: NORMAL
O+P STL CONC: NORMAL
SPECIMEN SOURCE: NORMAL

## 2023-01-03 ENCOUNTER — TELEPHONE (OUTPATIENT)
Dept: PULMONOLOGY | Age: 68
End: 2023-01-03

## 2023-01-03 NOTE — TELEPHONE ENCOUNTER
----- Message from Che Camilo MD sent at 1/2/2023  2:41 PM EST -----  CT was reviewed and reveals stable nodules. Please call patient and let him know.  Thanks

## 2023-01-04 ENCOUNTER — TELEPHONE (OUTPATIENT)
Dept: PRIMARY CARE CLINIC | Facility: CLINIC | Age: 68
End: 2023-01-04

## 2023-01-04 ENCOUNTER — TELEPHONE (OUTPATIENT)
Dept: PULMONOLOGY | Age: 68
End: 2023-01-04

## 2023-01-04 NOTE — TELEPHONE ENCOUNTER
----- Message from Suzette Simms MD sent at 1/2/2023  2:41 PM EST -----  CT was reviewed and reveals stable nodules. Please call patient and let him know.  Thanks

## 2023-01-04 NOTE — TELEPHONE ENCOUNTER
Spoke to the patient and gave him results from last CT ordered by Dr Jelani Wilson, shows stable nodules.  Patient voiced understanding Jaclyn Chino

## 2023-01-04 NOTE — TELEPHONE ENCOUNTER
----- Message from Bridgette Martinez MD sent at 1/3/2023  7:20 PM EST -----  Stool studies normal  Doppler studies legs-no blockages  Labs stable except--elevated blood sugar slightly-follow better low carb diet    All other labs stable

## 2023-01-15 PROBLEM — Z12.5 SCREENING FOR PROSTATE CANCER: Status: RESOLVED | Noted: 2022-12-16 | Resolved: 2023-01-15

## 2023-01-30 ENCOUNTER — OFFICE VISIT (OUTPATIENT)
Dept: PULMONOLOGY | Age: 68
End: 2023-01-30
Payer: MEDICARE

## 2023-01-30 VITALS
RESPIRATION RATE: 14 BRPM | DIASTOLIC BLOOD PRESSURE: 62 MMHG | SYSTOLIC BLOOD PRESSURE: 112 MMHG | WEIGHT: 165.2 LBS | HEIGHT: 65 IN | BODY MASS INDEX: 27.52 KG/M2 | HEART RATE: 98 BPM | TEMPERATURE: 98.3 F | OXYGEN SATURATION: 95 %

## 2023-01-30 DIAGNOSIS — R91.8 LUNG NODULES: ICD-10-CM

## 2023-01-30 DIAGNOSIS — F17.200 TOBACCO DEPENDENCE: ICD-10-CM

## 2023-01-30 DIAGNOSIS — R91.8 OTHER NONSPECIFIC ABNORMAL FINDING OF LUNG FIELD: Primary | ICD-10-CM

## 2023-01-30 DIAGNOSIS — J44.9 COPD, MODERATE (HCC): ICD-10-CM

## 2023-01-30 DIAGNOSIS — F17.210 NICOTINE DEPENDENCE, CIGARETTES, UNCOMPLICATED: ICD-10-CM

## 2023-01-30 LAB
EXPIRATORY TIME: NORMAL
FEF 25-75% %PRED-PRE: NORMAL
FEF 25-75% PRED: NORMAL
FEF 25-75%-PRE: NORMAL
FEV1 %PRED-PRE: 62 %
FEV1 PRED: NORMAL
FEV1/FVC %PRED-PRE: NORMAL
FEV1/FVC PRED: NORMAL
FEV1/FVC: 72 %
FEV1: 1.77 L
FVC %PRED-PRE: 65 %
FVC PRED: NORMAL
FVC: 2.45 L
PEF %PRED-PRE: NORMAL
PEF PRED: NORMAL
PEF-PRE: NORMAL

## 2023-01-30 PROCEDURE — 4004F PT TOBACCO SCREEN RCVD TLK: CPT | Performed by: INTERNAL MEDICINE

## 2023-01-30 PROCEDURE — 99406 BEHAV CHNG SMOKING 3-10 MIN: CPT | Performed by: INTERNAL MEDICINE

## 2023-01-30 PROCEDURE — G8417 CALC BMI ABV UP PARAM F/U: HCPCS | Performed by: INTERNAL MEDICINE

## 2023-01-30 PROCEDURE — 3023F SPIROM DOC REV: CPT | Performed by: INTERNAL MEDICINE

## 2023-01-30 PROCEDURE — 3074F SYST BP LT 130 MM HG: CPT | Performed by: INTERNAL MEDICINE

## 2023-01-30 PROCEDURE — G8427 DOCREV CUR MEDS BY ELIG CLIN: HCPCS | Performed by: INTERNAL MEDICINE

## 2023-01-30 PROCEDURE — G8484 FLU IMMUNIZE NO ADMIN: HCPCS | Performed by: INTERNAL MEDICINE

## 2023-01-30 PROCEDURE — 1123F ACP DISCUSS/DSCN MKR DOCD: CPT | Performed by: INTERNAL MEDICINE

## 2023-01-30 PROCEDURE — 3078F DIAST BP <80 MM HG: CPT | Performed by: INTERNAL MEDICINE

## 2023-01-30 PROCEDURE — 94010 BREATHING CAPACITY TEST: CPT | Performed by: INTERNAL MEDICINE

## 2023-01-30 PROCEDURE — 3017F COLORECTAL CA SCREEN DOC REV: CPT | Performed by: INTERNAL MEDICINE

## 2023-01-30 PROCEDURE — 99214 OFFICE O/P EST MOD 30 MIN: CPT | Performed by: INTERNAL MEDICINE

## 2023-01-30 RX ORDER — IPRATROPIUM BROMIDE AND ALBUTEROL SULFATE 2.5; .5 MG/3ML; MG/3ML
3 SOLUTION RESPIRATORY (INHALATION) 3 TIMES DAILY
Qty: 360 ML | Refills: 11 | Status: SHIPPED | OUTPATIENT
Start: 2023-01-30

## 2023-01-30 ASSESSMENT — PULMONARY FUNCTION TESTS
FVC: 2.45
FEV1_PERCENT_PREDICTED_PRE: 62
FEV1: 1.77
FVC_PERCENT_PREDICTED_PRE: 65
FEV1/FVC: 72

## 2023-01-30 NOTE — PROGRESS NOTES
Name:  Ezequiel Mckeon  YOB: 1955   MRN: [id number]      Office Visit: 1/30/2023        ASSESSMENT AND PLAN:  (Medical Decision Making)    Impression: This is 67 years old  male with history of moderate COPD and lung nodules.  He was accompanied by his friend and he continues to smoke.  He was counseled about smoking cessation again and discussed with him most recent CT findings.  See below for further detail    1. Other nonspecific abnormal finding of lung field the right upper lobe nodule is stable on most recent CT of the chest  - Spirometry Without Bronchodilator    2. COPD, moderate (HCC)  Spirometry today was pretty similar to the last office visit.  Encourage smoking cessation and will continue on DuoNeb 3 times daily and as needed    3. Tobacco dependence  Discussed smoking cessation in detail to assist    4. Lung nodules  Right upper lobe nodule and stable most recent CT of the chest    Orders Placed This Encounter   Medications    ipratropium-albuterol (DUONEB) 0.5-2.5 (3) MG/3ML SOLN nebulizer solution     Sig: Inhale 3 mLs into the lungs 3 times daily     Dispense:  360 mL     Refill:  11     No orders of the defined types were placed in this encounter.    Follow-up and Dispositions    Return in about 6 months (around 7/30/2023) for with spirometry.       Monet Marcano MD    No specialty comments available.    Total time for encounter on day of encounter was 35 minutes.  This time includes chart prep, review of tests/procedures, review of other provider's notes, documentation and counseling patient regarding disease process and medications.   _________________________________________________________________________    HISTORY OF PRESENT ILLNESS:    Mr. Ezequiel Mckeon is a 67 y.o. male who is seen at [facility] for  Follow-up (Pulmonary Nodule). He was accompanied by his friend and he stated that he has multiple medical problems. He had follow-up  low-dose screening CT  of the chest which revealed stable right upper lobe 6 mm nodule. He is from [facility] and never been told he had lung disease previously. He had extensive family history with his mother had possible asthma and 2 uncle had COPD. Also history of stomach cancer and kidney cancer in his family. He lived in [place], [place] and he had 2 cats at home as pet. Currently he is disabled. He indicated he has stopped Wellbutrin and working on his smoking  cessation effort. He continues to smoke about 1 or 2 cigarettes a day. He is using his nebulizer treatment with a DuoNeb 3 times a day. He is no longer taking Requip either. We discussed different aspect to help him to stop smoking completely. I suggested to him to try to increase his outdoor activity since we will help him to be less bored and use chewing gum if needed and help him to adjust his mindset. We will continue his surveillance for low-dose CT  on average every 6 to 9 months. We will renew his nebulizer medication and arrange follow-up in 6 months. REVIEW OF SYSTEMS: 10 point review of systems is negative except as reported in HPI. PHYSICAL EXAM: Body mass index is 27.49 kg/m². Vitals:    01/30/23 0956   BP: 112/62   Pulse: 98   Resp: 14   Temp: 98.3 °F (36.8 °C)   SpO2: 95%   Weight: 165 lb 3.2 oz (74.9 kg)   Height: 5' 5\" (1.651 m)         General:   Alert, cooperative, no distress, appears stated age. Eyes:   Conjunctivae/corneas clear. PERRL        Mouth/Throat:  Lips, mucosa, and tongue normal. Teeth and gums normal.        Lungs:   Breath sounds are clear bilaterally without any wheezing     Heart:   Regular rate and rhythm, S1, S2 normal, no murmur, click, rub or gallop. Abdomen:    Soft, non-tender. Extremities:  Extremities normal, atraumatic, no cyanosis or edema.      Skin:  Skin color normal. No rashes or lesions     Neurologic:  A&Ox3     DIAGNOSTIC TESTS: LABS:   Lab Results   Component Value Date/Time    WBC 7.8 12/21/2022 10:00 AM    HGB 16.0 12/21/2022 10:00 AM    HCT 46.0 12/21/2022 10:00 AM     12/21/2022 10:00 AM     Imaging: I performed an independent interpretation of the patient's images. CXR:   XR ABDOMEN (KUB) (SINGLE AP VIEW) 06/21/2021    Narrative  KUB    CLINICAL INDICATION: Hematuria, concern for kidney stone    FINDINGS: Two views of the abdomen and pelvis submitted. Small left-sided kidney  stones are suspected. No definite ureteral stone. Cholecystectomy clips are  present. There is an indeterminate calcification in the pelvis. The bowel gas  pattern is unremarkable. Impression  1. Left-sided kidney stones suspected. 2. Indeterminate calcified structure in the pelvis. CT Chest:   CT CHEST WO CONTRAST 12/27/2022    Narrative  CT CHEST    INDICATION: History of pulmonary nodules. Multiple axial images were obtained through the chest without IV contrast.  Radiation dose reduction techniques were used for this study: All CT scans  performed at this facility use one or all of the following: Automated exposure  control, adjustment of the mA and/or kVp according to patient's size, iterative  reconstruction. COMPARISON: 12/21/2021    FINDINGS:  - LUNGS: There are stable small benign-appearing nodules in both lungs. Largest  is right upper lobe, 5 mm. Some of the nodules are calcified. No new or  enlarging pulmonary mass. No infiltrates or effusions.  - MEDIASTINUM/AXILLA: No significant adenopathy.    - HEART/VESSELS: Mild vascular calcification. Heart size is normal.  - CHEST WALL/BONES: Normal.  - UPPER ABDOMEN: No acute findings. Impression  Stable benign-appearing pulmonary nodules. Nuclear Medicine: No results found for this or any previous visit from the past 3650 days.     PFTs:   Office Spirometry Results Latest Ref Rng & Units 1/30/2023   FVC L 2.45   FEV1 L 1.77   FEV1 %PRED-PRE % 62   FVC %PRED-PRE % 65   FEV1/FVC % 72     No results found for this or any previous visit. No results found for this or any previous visit. FeNO: No results found for this or any previous visit. FeNO and Likelihood of Eosinophilic Asthma   Unlikely Intermediate Likely   <25 ppb 25-50 ppb >50ppb   Exercise Oximetry:  Echo:   TRANSTHORACIC ECHOCARDIOGRAM (TTE) COMPLETE (CONTRAST/BUBBLE/3D PRN) 10/11/2022    Interpretation Summary    Left Ventricle: Low normal left ventricular systolic function with a visually estimated EF of 50 - 55%. Left ventricle size is normal. Normal wall thickness. Normal wall motion. Normal diastolic function. Mitral Valve: Mild regurgitation. Tricuspid Valve: Mild regurgitation. The estimated RVSP is 25 mmHg. Technical qualifiers: Technically difficult study, color flow Doppler was performed and pulse wave and/or continuous wave Doppler was performed. Contrast used: Definity. [facility] Reference Info:                                                                                                                Exposure History:  Second Hand Smoke Exposure: Yes  Birds: No  Asbestos: No  TB: No  Hot Tubs/Humidifier: No  Organic/Inorganic Dusts: No  Molds: No  Occupation/Hobbies: Retired    Past Medical History:   Diagnosis Date    Angina pectoris ([address] 11/17/2016    Anxiety 11/17/2016    Arm pain 11/17/2016    Asthma     controlled with prn medication. Last exacerbation March 2011    BPH (benign prostatic hyperplasia) 11/17/2016    Chest pain 11/17/2016    -daily  OV: 4/25/13 He continues to have substernal pressure with ex., relieved with rest. Stress test was non-dx.      Cholelithiasis without cholecystitis 11/04/2010    Chronic airway obstruction, not elsewhere classified 9/21/2010    Chronic back pain 11/17/2016    Chronic leg pain 11/17/2016    Depression     Dyslipidemia 11/17/2016    Atherogenic Dyslipidemia/Hyperlipidemia    Generalized anxiety disorder 2016    GERD (gastroesophageal reflux disease)     controlled with med. History of kidney stones     HTN (hypertension) 2016    Hypercholesterolemia     controlled with medication    Hypertension     controlled with meds.     Ill-defined condition     Break Out on Lt Leg    Insomnia secondary to anxiety 2016    Major depressive disorder, recurrent ([address] 2016    Petechiae 2016    Rectal bleeding 2016    SOB (shortness of breath) 2019    Tobacco dependence 2016    Tobacco use disorder 2016        Tobacco Use      Smoking status: Every Day        Packs/day: 0.25        Years: 46.00        Pack years: 11.5        Types: Cigarettes      Smokeless tobacco: Never      Tobacco comments: Quit smokin-2 cig per day    No Known Allergies  Current Outpatient Medications   Medication Instructions    ascorbic acid (VITAMIN C) 500 mg, Oral, DAILY    aspirin 81 mg, Oral, DAILY    atenolol (TENORMIN) 25 mg, Oral, DAILY    busPIRone (BUSPAR) 5 mg, Oral, 2 TIMES DAILY    ipratropium-albuterol (DUONEB) 0.5-2.5 (3) MG/3ML SOLN nebulizer solution 3 mLs, Inhalation, 3 TIMES DAILY    Multiple Vitamin (MULTIVITAMINS PO) 1 tablet, Oral, DAILY    nystatin (MYCOSTATIN) 756922 UNIT/GM ointment Apply topically 2 times daily for 2 weeks    Omega-3 Fatty Acids (FISH OIL) 1200 MG CAPS 1 capsule, Oral, DAILY    omeprazole (PRILOSEC) 40 mg, Oral, DAILY    rOPINIRole (REQUIP) 1 mg, Oral, NIGHTLY    rosuvastatin (CRESTOR) 40 mg, Oral, DAILY    sertraline (ZOLOFT) 100 mg, Oral, EVERY MORNING    tamsulosin (FLOMAX) 0.4 mg, Oral, DAILY    traZODone (DESYREL) 100 mg, Oral, NIGHTLY

## 2023-03-06 NOTE — PROGRESS NOTES
[facility] CARDIOLOGY Follow Up                 Reason for Visit: Hyperlipidemia    Subjective:     Patient is a 74 y.o. male with a PMH of elevated CAC score (74), hyperlipidemia and COPD who presents for follow-up. The patient was last seen in September 2022. A TTE was ordered for LAM, having recently had an C that essentially demonstrated normal coronary arteries. It was recommended that he discontinue fish oil, which he was going to consider at home. The patient had a TTE in October 2022 that was noted to demonstrate a low normal EF and mild MR. The patient denies angina. He reports occasional LAM lifting \"heavy things\". Past Medical History:   Diagnosis Date    Angina pectoris ([facility] [address] 11/17/2016    Anxiety 11/17/2016    Arm pain 11/17/2016    Asthma     controlled with prn medication. Last exacerbation March 2011    BPH (benign prostatic hyperplasia) 11/17/2016    Chest pain 11/17/2016    -daily  OV: 4/25/13 He continues to have substernal pressure with ex., relieved with rest. Stress test was non-dx. Cholelithiasis without cholecystitis 11/04/2010    Chronic airway obstruction, not elsewhere classified 9/21/2010    Chronic back pain 11/17/2016    Chronic leg pain 11/17/2016    Depression     Dyslipidemia 11/17/2016    Atherogenic Dyslipidemia/Hyperlipidemia    Generalized anxiety disorder 11/17/2016    GERD (gastroesophageal reflux disease)     controlled with med. History of kidney stones     HTN (hypertension) 11/17/2016    Hypercholesterolemia     controlled with medication    Hypertension     controlled with meds.     Ill-defined condition     Break Out on Lt Leg    Insomnia secondary to anxiety 11/17/2016    Major depressive disorder, recurrent ([address] 11/17/2016    Petechiae 11/17/2016    Rectal bleeding 11/17/2016    SOB (shortness of breath) 12/2019    Tobacco dependence 11/17/2016    Tobacco use disorder 11/17/2016      Past Surgical History:   Procedure Laterality Date    CARDIAC PROCEDURE N/A 2022    Left heart cath / coronary angiography performed by Sasha Roque MD at [facility] CATH LAB    CHOLECYSTECTOMY      [address]  age 12 yrs    HERNIA REPAIR Bilateral     HERNIA REPAIR  age 17 yrs    LITHOTRIPSY  late 20's      Family History   Problem Relation Age of Onset    Heart Disease Sister     Lung Disease Mother     Diabetes Brother     Heart Disease Brother     Heart Disease Sister     Diabetes Sister     Diabetes Father     Hypertension Father     Heart Disease Father     Diabetes Brother     Heart Disease Brother     Stroke Paternal Grandfather     Heart Attack Paternal Grandfather     Coronary Art Dis Other         Family Hx of CAD    Heart Failure Other         A sister with CHF    Stroke Other         A sister with stroke    Elevated Lipids Other         Family Hx of Hyperlipidemia    Heart Disease Mother     Diabetes Mother     Diabetes Sister       Social History     Tobacco Use    Smoking status: Every Day     Packs/day: 0.25     Years: 46.00     Pack years: 11.50     Types: Cigarettes    Smokeless tobacco: Never    Tobacco comments:     Quit smokin-2 cig per day   Substance Use Topics    Alcohol use: No      No Known Allergies      ROS:  No obvious pertinent positives on review of systems except for what was outlined above.        Objective:       BP (!) 100/58   Pulse 66   Ht 5' 5\" (1.651 m)   Wt 157 lb 14.4 oz (71.6 kg)   BMI 26.28 kg/m²     BP Readings from Last 3 Encounters:   23 (!) 100/58   23 112/62   12/15/22 100/60       Wt Readings from Last 3 Encounters:   23 157 lb 14.4 oz (71.6 kg)   23 165 lb 3.2 oz (74.9 kg)   12/15/22 163 lb (73.9 kg)       General/Constitutional:   Alert and oriented x 3, no acute distress  HEENT:   normocephalic, atraumatic, moist mucous membranes  Neck:   No JVD or carotid bruits bilaterally  Cardiovascular:   regular rate and rhythm, no rub/gallop appreciated  Pulmonary:   clear to auscultation bilaterally, no respiratory distress  Abdomen:   soft, non-tender, non-distended  Ext:   No sig LE edema bilaterally  Skin:  warm and dry, no obvious rashes seen  Neuro:   no obvious sensory or motor deficits  Psychiatric:   normal mood and affect    Data Review:   Lab Results   Component Value Date    CHOL 126 12/21/2022    CHOL 131 03/15/2022    CHOL 152 05/07/2021     Lab Results   Component Value Date    TRIG 102 12/21/2022    TRIG 148 03/15/2022    TRIG 351 (H) 05/07/2021     Lab Results   Component Value Date    HDL 38 (L) 12/21/2022    HDL 42 03/15/2022    HDL 40 05/07/2021     Lab Results   Component Value Date    LDLCALC 67.6 12/21/2022    LDLCALC 59.4 03/15/2022    LDLCALC 41.8 05/07/2021     Lab Results   Component Value Date    LABVLDL 20.4 12/21/2022    LABVLDL 29.6 (H) 03/15/2022    LABVLDL 70.2 (H) 05/07/2021     Lab Results   Component Value Date    CHOLHDLRATIO 3.3 12/21/2022    CHOLHDLRATIO 3.1 03/15/2022    CHOLHDLRATIO 3.8 05/07/2021        Lab Results   Component Value Date/Time     12/21/2022 10:00 AM     08/16/2022 10:14 AM     03/15/2022 12:02 PM    K 4.7 12/21/2022 10:00 AM    K 4.5 08/16/2022 10:14 AM    K 4.7 03/15/2022 12:02 PM     12/21/2022 10:00 AM     08/16/2022 10:14 AM     03/15/2022 12:02 PM    CO2 27 12/21/2022 10:00 AM    CO2 28 08/16/2022 10:14 AM    CO2 32 03/15/2022 12:02 PM    BUN 16 12/21/2022 10:00 AM    BUN 14 08/16/2022 10:14 AM    BUN 16 03/15/2022 12:02 PM    CREATININE 0.90 12/21/2022 10:00 AM    CREATININE 0.90 08/16/2022 10:14 AM    CREATININE 0.91 08/03/2022 08:16 AM    CREATININE 0.88 03/15/2022 12:02 PM    GLUCOSE 123 12/21/2022 10:00 AM    GLUCOSE 113 08/16/2022 10:14 AM    GLUCOSE 118 03/15/2022 12:02 PM    CALCIUM 8.7 12/21/2022 10:00 AM    CALCIUM 9.2 08/16/2022 10:14 AM    CALCIUM 9.0 03/15/2022 12:02 PM         Lab Results   Component Value Date    ALT 36 12/21/2022    ALT 36 03/15/2022    ALT 42 05/07/2021    AST 20 12/21/2022    AST 22 03/15/2022    AST 25 05/07/2021        Assessment/Plan:   1. Agatston coronary artery calcium score less than 100  - [facility] with essentially normal coronary arteries in August 2022  - Continue with Crestor    2. Hyperlipidemia, unspecified hyperlipidemia type  - Continue with Crestor  - Previously discussed the lack of robust data for fish oil supplement use to reduce cardiovascular outcomes and discussed that fish oil supplement use may increase the risk of atrial fibrillation; however, the patient was not committed to discontinuing the medication without further consideration at home    3. Mild mitral regurgitation by prior echocardiogram  - Surveillance echocardiogram in 2025 to 2027    4.  Chronic dyspnea  - [facility] with essentially normal coronary arteries in August 2022  - TTE with normal EF noted in October 2022  - Continue to follow with pulmonology for COPD    F/U: 6 months    Emy Preston MD

## 2023-03-07 ENCOUNTER — OFFICE VISIT (OUTPATIENT)
Dept: CARDIOLOGY CLINIC | Age: 68
End: 2023-03-07
Payer: MEDICARE

## 2023-03-07 VITALS
SYSTOLIC BLOOD PRESSURE: 100 MMHG | HEIGHT: 65 IN | HEART RATE: 66 BPM | DIASTOLIC BLOOD PRESSURE: 58 MMHG | WEIGHT: 157.9 LBS | BODY MASS INDEX: 26.31 KG/M2

## 2023-03-07 DIAGNOSIS — I34.0 MILD MITRAL REGURGITATION BY PRIOR ECHOCARDIOGRAM: ICD-10-CM

## 2023-03-07 DIAGNOSIS — R06.09 CHRONIC DYSPNEA: ICD-10-CM

## 2023-03-07 DIAGNOSIS — E78.5 HYPERLIPIDEMIA, UNSPECIFIED HYPERLIPIDEMIA TYPE: ICD-10-CM

## 2023-03-07 DIAGNOSIS — R93.1 AGATSTON CORONARY ARTERY CALCIUM SCORE LESS THAN 100: Primary | ICD-10-CM

## 2023-03-07 PROBLEM — I20.89 ANGINA AT REST: Status: RESOLVED | Noted: 2022-06-16 | Resolved: 2023-03-07

## 2023-03-07 PROBLEM — I20.8 ANGINA AT REST (HCC): Status: RESOLVED | Noted: 2022-06-16 | Resolved: 2023-03-07

## 2023-03-07 PROCEDURE — 3074F SYST BP LT 130 MM HG: CPT | Performed by: INTERNAL MEDICINE

## 2023-03-07 PROCEDURE — G8484 FLU IMMUNIZE NO ADMIN: HCPCS | Performed by: INTERNAL MEDICINE

## 2023-03-07 PROCEDURE — G8417 CALC BMI ABV UP PARAM F/U: HCPCS | Performed by: INTERNAL MEDICINE

## 2023-03-07 PROCEDURE — 3017F COLORECTAL CA SCREEN DOC REV: CPT | Performed by: INTERNAL MEDICINE

## 2023-03-07 PROCEDURE — 99214 OFFICE O/P EST MOD 30 MIN: CPT | Performed by: INTERNAL MEDICINE

## 2023-03-07 PROCEDURE — 3078F DIAST BP <80 MM HG: CPT | Performed by: INTERNAL MEDICINE

## 2023-03-07 PROCEDURE — 4004F PT TOBACCO SCREEN RCVD TLK: CPT | Performed by: INTERNAL MEDICINE

## 2023-03-07 PROCEDURE — G8427 DOCREV CUR MEDS BY ELIG CLIN: HCPCS | Performed by: INTERNAL MEDICINE

## 2023-03-07 PROCEDURE — 1123F ACP DISCUSS/DSCN MKR DOCD: CPT | Performed by: INTERNAL MEDICINE

## 2023-05-25 ENCOUNTER — TELEPHONE (OUTPATIENT)
Dept: PULMONOLOGY | Age: 68
End: 2023-05-25

## 2023-07-10 ENCOUNTER — OFFICE VISIT (OUTPATIENT)
Dept: PRIMARY CARE CLINIC | Facility: CLINIC | Age: 68
End: 2023-07-10
Payer: MEDICARE

## 2023-07-10 VITALS
TEMPERATURE: 98.3 F | HEART RATE: 63 BPM | SYSTOLIC BLOOD PRESSURE: 112 MMHG | OXYGEN SATURATION: 95 % | DIASTOLIC BLOOD PRESSURE: 60 MMHG | WEIGHT: 156 LBS | BODY MASS INDEX: 25.99 KG/M2 | HEIGHT: 65 IN

## 2023-07-10 DIAGNOSIS — K52.9 CHRONIC DIARRHEA: ICD-10-CM

## 2023-07-10 DIAGNOSIS — E78.1 HYPERTRIGLYCERIDEMIA: ICD-10-CM

## 2023-07-10 DIAGNOSIS — E66.3 OVERWEIGHT: ICD-10-CM

## 2023-07-10 DIAGNOSIS — Z71.3 DIETARY COUNSELING: ICD-10-CM

## 2023-07-10 DIAGNOSIS — G25.81 RESTLESS LEG SYNDROME: ICD-10-CM

## 2023-07-10 DIAGNOSIS — E78.5 DYSLIPIDEMIA: ICD-10-CM

## 2023-07-10 DIAGNOSIS — F33.41 MAJOR DEPRESSIVE DISORDER, RECURRENT, IN PARTIAL REMISSION (HCC): ICD-10-CM

## 2023-07-10 DIAGNOSIS — J44.9 COPD, MODERATE (HCC): ICD-10-CM

## 2023-07-10 DIAGNOSIS — M54.9 CHRONIC BACK PAIN, UNSPECIFIED BACK LOCATION, UNSPECIFIED BACK PAIN LATERALITY: ICD-10-CM

## 2023-07-10 DIAGNOSIS — I10 ESSENTIAL HYPERTENSION: ICD-10-CM

## 2023-07-10 DIAGNOSIS — G89.29 CHRONIC BACK PAIN, UNSPECIFIED BACK LOCATION, UNSPECIFIED BACK PAIN LATERALITY: ICD-10-CM

## 2023-07-10 DIAGNOSIS — K21.9 GASTROESOPHAGEAL REFLUX DISEASE WITHOUT ESOPHAGITIS: ICD-10-CM

## 2023-07-10 DIAGNOSIS — Z00.00 MEDICARE ANNUAL WELLNESS VISIT, SUBSEQUENT: Primary | ICD-10-CM

## 2023-07-10 DIAGNOSIS — N40.0 BENIGN PROSTATIC HYPERPLASIA WITHOUT LOWER URINARY TRACT SYMPTOMS: ICD-10-CM

## 2023-07-10 PROCEDURE — 3074F SYST BP LT 130 MM HG: CPT | Performed by: FAMILY MEDICINE

## 2023-07-10 PROCEDURE — 4004F PT TOBACCO SCREEN RCVD TLK: CPT | Performed by: FAMILY MEDICINE

## 2023-07-10 PROCEDURE — 3023F SPIROM DOC REV: CPT | Performed by: FAMILY MEDICINE

## 2023-07-10 PROCEDURE — 3017F COLORECTAL CA SCREEN DOC REV: CPT | Performed by: FAMILY MEDICINE

## 2023-07-10 PROCEDURE — G8417 CALC BMI ABV UP PARAM F/U: HCPCS | Performed by: FAMILY MEDICINE

## 2023-07-10 PROCEDURE — 3078F DIAST BP <80 MM HG: CPT | Performed by: FAMILY MEDICINE

## 2023-07-10 PROCEDURE — G0439 PPPS, SUBSEQ VISIT: HCPCS | Performed by: FAMILY MEDICINE

## 2023-07-10 PROCEDURE — 99214 OFFICE O/P EST MOD 30 MIN: CPT | Performed by: FAMILY MEDICINE

## 2023-07-10 PROCEDURE — 1123F ACP DISCUSS/DSCN MKR DOCD: CPT | Performed by: FAMILY MEDICINE

## 2023-07-10 PROCEDURE — G8427 DOCREV CUR MEDS BY ELIG CLIN: HCPCS | Performed by: FAMILY MEDICINE

## 2023-07-10 RX ORDER — ATENOLOL 25 MG/1
25 TABLET ORAL DAILY
COMMUNITY
End: 2023-07-10 | Stop reason: SDUPTHER

## 2023-07-10 RX ORDER — ROSUVASTATIN CALCIUM 40 MG/1
40 TABLET, COATED ORAL DAILY
Qty: 90 TABLET | Refills: 1 | Status: SHIPPED | OUTPATIENT
Start: 2023-07-10

## 2023-07-10 RX ORDER — TAMSULOSIN HYDROCHLORIDE 0.4 MG/1
0.4 CAPSULE ORAL
Qty: 90 CAPSULE | Refills: 1 | Status: SHIPPED | OUTPATIENT
Start: 2023-07-10

## 2023-07-10 RX ORDER — ATENOLOL 25 MG/1
25 TABLET ORAL DAILY
Qty: 90 TABLET | Refills: 1 | Status: SHIPPED | OUTPATIENT
Start: 2023-07-10

## 2023-07-10 RX ORDER — MULTIVIT WITH MINERALS/LUTEIN
1000 TABLET ORAL DAILY
COMMUNITY

## 2023-07-10 RX ORDER — OMEPRAZOLE 40 MG/1
40 CAPSULE, DELAYED RELEASE ORAL DAILY
Qty: 90 CAPSULE | Refills: 0 | Status: SHIPPED | OUTPATIENT
Start: 2023-07-10

## 2023-07-10 SDOH — ECONOMIC STABILITY: INCOME INSECURITY: HOW HARD IS IT FOR YOU TO PAY FOR THE VERY BASICS LIKE FOOD, HOUSING, MEDICAL CARE, AND HEATING?: NOT HARD AT ALL

## 2023-07-10 SDOH — ECONOMIC STABILITY: HOUSING INSECURITY
IN THE LAST 12 MONTHS, WAS THERE A TIME WHEN YOU DID NOT HAVE A STEADY PLACE TO SLEEP OR SLEPT IN A SHELTER (INCLUDING NOW)?: NO

## 2023-07-10 SDOH — ECONOMIC STABILITY: FOOD INSECURITY: WITHIN THE PAST 12 MONTHS, YOU WORRIED THAT YOUR FOOD WOULD RUN OUT BEFORE YOU GOT MONEY TO BUY MORE.: NEVER TRUE

## 2023-07-10 SDOH — ECONOMIC STABILITY: FOOD INSECURITY: WITHIN THE PAST 12 MONTHS, THE FOOD YOU BOUGHT JUST DIDN'T LAST AND YOU DIDN'T HAVE MONEY TO GET MORE.: NEVER TRUE

## 2023-07-10 ASSESSMENT — PATIENT HEALTH QUESTIONNAIRE - PHQ9
SUM OF ALL RESPONSES TO PHQ QUESTIONS 1-9: 0
3. TROUBLE FALLING OR STAYING ASLEEP: 0
10. IF YOU CHECKED OFF ANY PROBLEMS, HOW DIFFICULT HAVE THESE PROBLEMS MADE IT FOR YOU TO DO YOUR WORK, TAKE CARE OF THINGS AT HOME, OR GET ALONG WITH OTHER PEOPLE: 0
SUM OF ALL RESPONSES TO PHQ9 QUESTIONS 1 & 2: 0
8. MOVING OR SPEAKING SO SLOWLY THAT OTHER PEOPLE COULD HAVE NOTICED. OR THE OPPOSITE, BEING SO FIGETY OR RESTLESS THAT YOU HAVE BEEN MOVING AROUND A LOT MORE THAN USUAL: 0
5. POOR APPETITE OR OVEREATING: 0
4. FEELING TIRED OR HAVING LITTLE ENERGY: 0
9. THOUGHTS THAT YOU WOULD BE BETTER OFF DEAD, OR OF HURTING YOURSELF: 0
SUM OF ALL RESPONSES TO PHQ QUESTIONS 1-9: 0
6. FEELING BAD ABOUT YOURSELF - OR THAT YOU ARE A FAILURE OR HAVE LET YOURSELF OR YOUR FAMILY DOWN: 0
2. FEELING DOWN, DEPRESSED OR HOPELESS: 0
1. LITTLE INTEREST OR PLEASURE IN DOING THINGS: 0
7. TROUBLE CONCENTRATING ON THINGS, SUCH AS READING THE NEWSPAPER OR WATCHING TELEVISION: 0

## 2023-07-10 ASSESSMENT — ENCOUNTER SYMPTOMS
ALLERGIC/IMMUNOLOGIC NEGATIVE: 1
GASTROINTESTINAL NEGATIVE: 1
EYES NEGATIVE: 1

## 2023-07-10 NOTE — PROGRESS NOTES
benign-appearing nodules in both lungs. Largest  is right upper lobe, 5 mm. Some of the nodules are calcified. No new or  enlarging pulmonary mass. No infiltrates or effusions.  - MEDIASTINUM/AXILLA: No significant adenopathy.    - HEART/VESSELS: Mild vascular calcification. Heart size is normal.  - CHEST WALL/BONES: Normal.  - UPPER ABDOMEN: No acute findings. Impression  Stable benign-appearing pulmonary nodules. US Result (most recent):  US ROSAMARIA LUAN REST AND POST TREAD     Impression  Stress ROSAMARIA:  Upper extremity pressure is 136 mmHg. Right ankle pressure is 145   MmHG. Pressure involving right dorsalis pedis artery is 150 mmHg. Left ankle pressure is 140 mmHg. Pressure involving left dorsalis pedis artery is 150 mmHg. ROSAMARIA on   right side is   1.06. ROSAMARIA on left side is 1.02. Normal multiphasic wave pattern is seen on both side. After 3 minutes of exercise upper extremity pressure is 152 mmHg. Right ankle pressure is 158   MmHG. Pressure involving the right dorsalis pedis artery is 159 mmHg. Left ankle pressure is   159     MmHg. Pressure involving left dorsalis pedis artery is 152 mmHg. Normal multiphasic wave pattern is noted. Stress ROSAMARIA on right side is 1.03. Stress ROSAMARIA on the left side is 1.04. Conclusion: Stress ROSAMARIA on   right side is normal.  Stress ROSAMARIA on the left side is normal.    Electronically signed by:  Chris Huddleston MD    Disclaimer: The images associated with this test are located within a 3rd party PACs. F/u on test results  F/u as needed    We discussed the expected course, resolution and complications of the diagnosis(es) in detail. Medication risks, benefits, costs, interactions, and alternatives were discussed as indicated. I advised her to contact the office if her condition worsens, changes or fails to improve as anticipated. She expressed understanding with the diagnosis(es) and plan. Return in about 6 months (around 1/10/2024).      Mercedes Diallo

## 2023-07-11 ENCOUNTER — COMMUNITY OUTREACH (OUTPATIENT)
Dept: PRIMARY CARE CLINIC | Facility: CLINIC | Age: 68
End: 2023-07-11

## 2023-07-11 NOTE — PROGRESS NOTES
Patient's HM shows they are overdue for Colonoscopy. [employer] and  files searched  without success.

## 2023-07-25 DIAGNOSIS — F33.41 MAJOR DEPRESSIVE DISORDER, RECURRENT, IN PARTIAL REMISSION (HCC): ICD-10-CM

## 2023-07-25 DIAGNOSIS — E78.1 HYPERTRIGLYCERIDEMIA: ICD-10-CM

## 2023-07-25 DIAGNOSIS — E66.3 OVERWEIGHT: ICD-10-CM

## 2023-07-25 DIAGNOSIS — K21.9 GASTROESOPHAGEAL REFLUX DISEASE WITHOUT ESOPHAGITIS: ICD-10-CM

## 2023-07-25 DIAGNOSIS — G25.81 RESTLESS LEG SYNDROME: ICD-10-CM

## 2023-07-25 DIAGNOSIS — J44.9 COPD, MODERATE (HCC): ICD-10-CM

## 2023-07-25 DIAGNOSIS — K52.9 CHRONIC DIARRHEA: ICD-10-CM

## 2023-07-25 DIAGNOSIS — Z00.00 MEDICARE ANNUAL WELLNESS VISIT, SUBSEQUENT: ICD-10-CM

## 2023-07-25 DIAGNOSIS — M54.9 CHRONIC BACK PAIN, UNSPECIFIED BACK LOCATION, UNSPECIFIED BACK PAIN LATERALITY: ICD-10-CM

## 2023-07-25 DIAGNOSIS — N40.0 BENIGN PROSTATIC HYPERPLASIA WITHOUT LOWER URINARY TRACT SYMPTOMS: ICD-10-CM

## 2023-07-25 DIAGNOSIS — Z71.3 DIETARY COUNSELING: ICD-10-CM

## 2023-07-25 DIAGNOSIS — E78.5 DYSLIPIDEMIA: ICD-10-CM

## 2023-07-25 DIAGNOSIS — I10 ESSENTIAL HYPERTENSION: ICD-10-CM

## 2023-07-25 DIAGNOSIS — G89.29 CHRONIC BACK PAIN, UNSPECIFIED BACK LOCATION, UNSPECIFIED BACK PAIN LATERALITY: ICD-10-CM

## 2023-07-25 LAB
ALBUMIN SERPL-MCNC: 4 G/DL (ref 3.2–4.6)
ALBUMIN/GLOB SERPL: 1.2 (ref 0.4–1.6)
ALP SERPL-CCNC: 89 U/L (ref 50–136)
ALT SERPL-CCNC: 22 U/L (ref 12–65)
ANION GAP SERPL CALC-SCNC: 0 MMOL/L (ref 2–11)
AST SERPL-CCNC: 15 U/L (ref 15–37)
BILIRUB SERPL-MCNC: 0.7 MG/DL (ref 0.2–1.1)
BUN SERPL-MCNC: 12 MG/DL (ref 8–23)
CALCIUM SERPL-MCNC: 9.1 MG/DL (ref 8.3–10.4)
CHLORIDE SERPL-SCNC: 110 MMOL/L (ref 101–110)
CHOLEST SERPL-MCNC: 121 MG/DL
CO2 SERPL-SCNC: 31 MMOL/L (ref 21–32)
CREAT SERPL-MCNC: 1 MG/DL (ref 0.8–1.5)
GLOBULIN SER CALC-MCNC: 3.3 G/DL (ref 2.8–4.5)
GLUCOSE SERPL-MCNC: 114 MG/DL (ref 65–100)
HDLC SERPL-MCNC: 42 MG/DL (ref 40–60)
HDLC SERPL: 2.9
LDLC SERPL CALC-MCNC: 62.2 MG/DL
POTASSIUM SERPL-SCNC: 5.3 MMOL/L (ref 3.5–5.1)
PROT SERPL-MCNC: 7.3 G/DL (ref 6.3–8.2)
SODIUM SERPL-SCNC: 141 MMOL/L (ref 133–143)
TRIGL SERPL-MCNC: 84 MG/DL (ref 35–150)
VLDLC SERPL CALC-MCNC: 16.8 MG/DL (ref 6–23)

## 2023-08-09 PROBLEM — Z00.00 MEDICARE ANNUAL WELLNESS VISIT, SUBSEQUENT: Status: RESOLVED | Noted: 2020-02-11 | Resolved: 2023-08-09

## 2023-08-25 ENCOUNTER — OFFICE VISIT (OUTPATIENT)
Dept: PULMONOLOGY | Age: 68
End: 2023-08-25

## 2023-08-25 VITALS
DIASTOLIC BLOOD PRESSURE: 62 MMHG | RESPIRATION RATE: 18 BRPM | HEART RATE: 68 BPM | TEMPERATURE: 98.6 F | WEIGHT: 156 LBS | HEIGHT: 65 IN | SYSTOLIC BLOOD PRESSURE: 114 MMHG | BODY MASS INDEX: 25.99 KG/M2 | OXYGEN SATURATION: 95 %

## 2023-08-25 DIAGNOSIS — R91.8 LUNG NODULES: ICD-10-CM

## 2023-08-25 DIAGNOSIS — F17.200 TOBACCO DEPENDENCE: ICD-10-CM

## 2023-08-25 DIAGNOSIS — Z87.891 PERSONAL HISTORY OF TOBACCO USE, PRESENTING HAZARDS TO HEALTH: ICD-10-CM

## 2023-08-25 DIAGNOSIS — J44.9 COPD, MODERATE (HCC): Primary | ICD-10-CM

## 2023-08-25 LAB
EXPIRATORY TIME: NORMAL
FEF 25-75% %PRED-PRE: NORMAL
FEF 25-75% PRED: NORMAL
FEF 25-75%-PRE: NORMAL
FEV1 %PRED-PRE: 63 %
FEV1 PRED: NORMAL
FEV1/FVC %PRED-PRE: NORMAL
FEV1/FVC PRED: NORMAL
FEV1/FVC: 71 %
FEV1: 1.79 L
FVC %PRED-PRE: 67 %
FVC PRED: NORMAL
FVC: 2.52 L
PEF %PRED-PRE: NORMAL
PEF PRED: NORMAL
PEF-PRE: NORMAL

## 2023-08-25 RX ORDER — IPRATROPIUM BROMIDE AND ALBUTEROL SULFATE 2.5; .5 MG/3ML; MG/3ML
3 SOLUTION RESPIRATORY (INHALATION) 3 TIMES DAILY
Qty: 360 ML | Refills: 11 | Status: SHIPPED | OUTPATIENT
Start: 2023-08-25

## 2023-08-25 ASSESSMENT — PULMONARY FUNCTION TESTS
FEV1_PERCENT_PREDICTED_PRE: 63
FVC: 2.52
FVC_PERCENT_PREDICTED_PRE: 67
FEV1/FVC: 71
FEV1: 1.79

## 2023-08-25 NOTE — PROGRESS NOTES
Name:  Marah Davila  YOB: 1955   MRN: [id number]      Office Visit: 8/25/2023        ASSESSMENT AND PLAN:  (Medical Decision Making)    Impression: This is 74years old  male with history of moderate COPD and lung nodules. He was accompanied by his friend and he continues to smoke. He was counseled about smoking cessation again and discussed with him most recent CT findings. See below for further detail    1. Other nonspecific abnormal finding of lung field the right upper lobe nodule is stable on most recent CT of the chest  - Spirometry Without Bronchodilator    2. COPD, moderate ([address])  Spirometry today was quite similar to the last office visit. Encourage smoking cessation and discussed with him the importance of smoking cessation. Will continue on DuoNeb 3 times daily and as needed and we will renew his medication for that. 3. Tobacco dependence  Discussed smoking cessation in detail to assist    4. Lung nodules  Right upper lobe nodule and stable most recent CT of the chest.  We will schedule follow-up CT of the chest as recommended. Orders Placed This Encounter   Medications    ipratropium 0.5 mg-albuterol 2.5 mg (DUONEB) 0.5-2.5 (3) MG/3ML SOLN nebulizer solution     Sig: Inhale 3 mLs into the lungs 3 times daily     Dispense:  360 mL     Refill:  11     No orders of the defined types were placed in this encounter. Follow-up and Dispositions    Return in about 6 months (around 2/25/2024) for COPD, lung nodules. Madina Avery MD    No specialty comments available. Total time for encounter on day of encounter was 35 minutes. This time includes chart prep, review of tests/procedures, review of other provider's notes, documentation and counseling patient regarding disease process and medications.    _________________________________________________________________________    HISTORY OF PRESENT ILLNESS:      Mr. Marah Davila is a 74 y.o. male who is seen at

## 2023-09-05 NOTE — PROGRESS NOTES
[facility] CARDIOLOGY Follow Up                 Reason for Visit: Elevated CAC score    Subjective:     Patient is a 74 y.o. male with a PMH of elevated CAC score (74), hyperlipidemia, hypertension and COPD who presents for follow-up. The patient was last seen in March 2023. He had a TTE in October 2022 that was noted to demonstrate a low normal EF and mild MR. The patient reports chest pain occasionally that lasts about two minutes. He describes it as a \"pressure\" and is not associated with hemoptysis. Past Medical History:   Diagnosis Date    Angina pectoris ([address]) 11/17/2016    Anxiety 11/17/2016    Arm pain 11/17/2016    Asthma     controlled with prn medication. Last exacerbation March 2011    BPH (benign prostatic hyperplasia) 11/17/2016    Chest pain 11/17/2016    -daily  OV: 4/25/13 He continues to have substernal pressure with ex., relieved with rest. Stress test was non-dx. Cholelithiasis without cholecystitis 11/04/2010    Chronic airway obstruction, not elsewhere classified 9/21/2010    Chronic back pain 11/17/2016    Chronic leg pain 11/17/2016    Depression     Dyslipidemia 11/17/2016    Atherogenic Dyslipidemia/Hyperlipidemia    Generalized anxiety disorder 11/17/2016    GERD (gastroesophageal reflux disease)     controlled with med. History of kidney stones     HTN (hypertension) 11/17/2016    Hypercholesterolemia     controlled with medication    Hypertension     controlled with meds.     Ill-defined condition     Break Out on Lt Leg    Insomnia secondary to anxiety 11/17/2016    Major depressive disorder, recurrent ([address]) 11/17/2016    Petechiae 11/17/2016    Rectal bleeding 11/17/2016    SOB (shortness of breath) 12/2019    Tobacco dependence 11/17/2016    Tobacco use disorder 11/17/2016      Past Surgical History:   Procedure Laterality Date    CARDIAC PROCEDURE N/A 8/25/2022    Left heart cath / coronary angiography performed by Mariposa Kendrick MD at [address]

## 2023-09-07 ENCOUNTER — OFFICE VISIT (OUTPATIENT)
Age: 68
End: 2023-09-07
Payer: MEDICARE

## 2023-09-07 VITALS
SYSTOLIC BLOOD PRESSURE: 110 MMHG | HEART RATE: 72 BPM | DIASTOLIC BLOOD PRESSURE: 72 MMHG | HEIGHT: 65 IN | WEIGHT: 156 LBS | BODY MASS INDEX: 25.99 KG/M2

## 2023-09-07 DIAGNOSIS — E78.5 HYPERLIPIDEMIA, UNSPECIFIED HYPERLIPIDEMIA TYPE: ICD-10-CM

## 2023-09-07 DIAGNOSIS — R93.1 AGATSTON CORONARY ARTERY CALCIUM SCORE LESS THAN 100: ICD-10-CM

## 2023-09-07 DIAGNOSIS — R07.89 ATYPICAL CHEST PAIN: ICD-10-CM

## 2023-09-07 DIAGNOSIS — I10 HYPERTENSION, UNSPECIFIED TYPE: ICD-10-CM

## 2023-09-07 DIAGNOSIS — I34.0 MILD MITRAL REGURGITATION BY PRIOR ECHOCARDIOGRAM: Primary | ICD-10-CM

## 2023-09-07 PROBLEM — R07.9 CHRONIC CHEST PAIN: Status: ACTIVE | Noted: 2023-09-07

## 2023-09-07 PROBLEM — G89.29 CHRONIC CHEST PAIN: Status: ACTIVE | Noted: 2023-09-07

## 2023-09-07 PROCEDURE — 3017F COLORECTAL CA SCREEN DOC REV: CPT | Performed by: INTERNAL MEDICINE

## 2023-09-07 PROCEDURE — 4004F PT TOBACCO SCREEN RCVD TLK: CPT | Performed by: INTERNAL MEDICINE

## 2023-09-07 PROCEDURE — G8427 DOCREV CUR MEDS BY ELIG CLIN: HCPCS | Performed by: INTERNAL MEDICINE

## 2023-09-07 PROCEDURE — 3074F SYST BP LT 130 MM HG: CPT | Performed by: INTERNAL MEDICINE

## 2023-09-07 PROCEDURE — 99214 OFFICE O/P EST MOD 30 MIN: CPT | Performed by: INTERNAL MEDICINE

## 2023-09-07 PROCEDURE — 3078F DIAST BP <80 MM HG: CPT | Performed by: INTERNAL MEDICINE

## 2023-09-07 PROCEDURE — 1123F ACP DISCUSS/DSCN MKR DOCD: CPT | Performed by: INTERNAL MEDICINE

## 2023-09-07 PROCEDURE — G8417 CALC BMI ABV UP PARAM F/U: HCPCS | Performed by: INTERNAL MEDICINE

## 2023-10-10 ENCOUNTER — TELEPHONE (OUTPATIENT)
Dept: PRIMARY CARE CLINIC | Facility: CLINIC | Age: 68
End: 2023-10-10

## 2023-10-10 ENCOUNTER — OFFICE VISIT (OUTPATIENT)
Dept: BEHAVIORAL/MENTAL HEALTH CLINIC | Age: 68
End: 2023-10-10
Payer: MEDICARE

## 2023-10-10 VITALS
TEMPERATURE: 98.6 F | HEART RATE: 69 BPM | WEIGHT: 157 LBS | HEIGHT: 65 IN | BODY MASS INDEX: 26.16 KG/M2 | DIASTOLIC BLOOD PRESSURE: 62 MMHG | SYSTOLIC BLOOD PRESSURE: 120 MMHG | OXYGEN SATURATION: 97 %

## 2023-10-10 DIAGNOSIS — F41.1 GENERALIZED ANXIETY DISORDER: ICD-10-CM

## 2023-10-10 DIAGNOSIS — F51.05 INSOMNIA DUE TO MENTAL DISORDER: ICD-10-CM

## 2023-10-10 DIAGNOSIS — F33.42 RECURRENT MAJOR DEPRESSIVE DISORDER, IN FULL REMISSION (HCC): Primary | ICD-10-CM

## 2023-10-10 PROCEDURE — G8417 CALC BMI ABV UP PARAM F/U: HCPCS | Performed by: PSYCHIATRY & NEUROLOGY

## 2023-10-10 PROCEDURE — G8427 DOCREV CUR MEDS BY ELIG CLIN: HCPCS | Performed by: PSYCHIATRY & NEUROLOGY

## 2023-10-10 PROCEDURE — 99214 OFFICE O/P EST MOD 30 MIN: CPT | Performed by: PSYCHIATRY & NEUROLOGY

## 2023-10-10 PROCEDURE — 4004F PT TOBACCO SCREEN RCVD TLK: CPT | Performed by: PSYCHIATRY & NEUROLOGY

## 2023-10-10 PROCEDURE — 3017F COLORECTAL CA SCREEN DOC REV: CPT | Performed by: PSYCHIATRY & NEUROLOGY

## 2023-10-10 PROCEDURE — 3074F SYST BP LT 130 MM HG: CPT | Performed by: PSYCHIATRY & NEUROLOGY

## 2023-10-10 PROCEDURE — 3078F DIAST BP <80 MM HG: CPT | Performed by: PSYCHIATRY & NEUROLOGY

## 2023-10-10 PROCEDURE — 1123F ACP DISCUSS/DSCN MKR DOCD: CPT | Performed by: PSYCHIATRY & NEUROLOGY

## 2023-10-10 PROCEDURE — G8484 FLU IMMUNIZE NO ADMIN: HCPCS | Performed by: PSYCHIATRY & NEUROLOGY

## 2023-10-10 RX ORDER — SERTRALINE HYDROCHLORIDE 100 MG/1
100 TABLET, FILM COATED ORAL EVERY MORNING
Qty: 90 TABLET | Refills: 1 | Status: SHIPPED | OUTPATIENT
Start: 2023-10-10

## 2023-10-10 RX ORDER — TRAZODONE HYDROCHLORIDE 100 MG/1
100 TABLET ORAL NIGHTLY
Qty: 90 TABLET | Refills: 1 | Status: SHIPPED | OUTPATIENT
Start: 2023-10-10

## 2023-10-10 RX ORDER — BUSPIRONE HYDROCHLORIDE 10 MG/1
10 TABLET ORAL 3 TIMES DAILY
Qty: 90 TABLET | Refills: 3 | Status: SHIPPED | OUTPATIENT
Start: 2023-10-10 | End: 2023-10-10 | Stop reason: SDUPTHER

## 2023-10-10 RX ORDER — BUSPIRONE HYDROCHLORIDE 10 MG/1
10 TABLET ORAL 3 TIMES DAILY
Qty: 270 TABLET | Refills: 1 | Status: SHIPPED | OUTPATIENT
Start: 2023-10-10

## 2023-10-10 RX ORDER — BUSPIRONE HYDROCHLORIDE 5 MG/1
5 TABLET ORAL 2 TIMES DAILY
Qty: 180 TABLET | Refills: 1 | Status: SHIPPED | OUTPATIENT
Start: 2023-10-10 | End: 2023-10-10 | Stop reason: SDUPTHER

## 2023-10-10 ASSESSMENT — PATIENT HEALTH QUESTIONNAIRE - PHQ9
9. THOUGHTS THAT YOU WOULD BE BETTER OFF DEAD, OR OF HURTING YOURSELF: 0
SUM OF ALL RESPONSES TO PHQ QUESTIONS 1-9: 2
8. MOVING OR SPEAKING SO SLOWLY THAT OTHER PEOPLE COULD HAVE NOTICED. OR THE OPPOSITE, BEING SO FIGETY OR RESTLESS THAT YOU HAVE BEEN MOVING AROUND A LOT MORE THAN USUAL: 0
10. IF YOU CHECKED OFF ANY PROBLEMS, HOW DIFFICULT HAVE THESE PROBLEMS MADE IT FOR YOU TO DO YOUR WORK, TAKE CARE OF THINGS AT HOME, OR GET ALONG WITH OTHER PEOPLE: 0
SUM OF ALL RESPONSES TO PHQ QUESTIONS 1-9: 2
2. FEELING DOWN, DEPRESSED OR HOPELESS: 0
5. POOR APPETITE OR OVEREATING: 0
6. FEELING BAD ABOUT YOURSELF - OR THAT YOU ARE A FAILURE OR HAVE LET YOURSELF OR YOUR FAMILY DOWN: 0
1. LITTLE INTEREST OR PLEASURE IN DOING THINGS: 0
SUM OF ALL RESPONSES TO PHQ QUESTIONS 1-9: 2
SUM OF ALL RESPONSES TO PHQ QUESTIONS 1-9: 2
3. TROUBLE FALLING OR STAYING ASLEEP: 0
4. FEELING TIRED OR HAVING LITTLE ENERGY: 2
SUM OF ALL RESPONSES TO PHQ9 QUESTIONS 1 & 2: 0
7. TROUBLE CONCENTRATING ON THINGS, SUCH AS READING THE NEWSPAPER OR WATCHING TELEVISION: 0

## 2023-10-10 ASSESSMENT — ANXIETY QUESTIONNAIRES
4. TROUBLE RELAXING: 0
3. WORRYING TOO MUCH ABOUT DIFFERENT THINGS: 0
5. BEING SO RESTLESS THAT IT IS HARD TO SIT STILL: 0
IF YOU CHECKED OFF ANY PROBLEMS ON THIS QUESTIONNAIRE, HOW DIFFICULT HAVE THESE PROBLEMS MADE IT FOR YOU TO DO YOUR WORK, TAKE CARE OF THINGS AT HOME, OR GET ALONG WITH OTHER PEOPLE: NOT DIFFICULT AT ALL
2. NOT BEING ABLE TO STOP OR CONTROL WORRYING: 0
1. FEELING NERVOUS, ANXIOUS, OR ON EDGE: 1
GAD7 TOTAL SCORE: 1
7. FEELING AFRAID AS IF SOMETHING AWFUL MIGHT HAPPEN: 0
6. BECOMING EASILY ANNOYED OR IRRITABLE: 0

## 2023-12-01 DIAGNOSIS — R91.8 LUNG NODULES: ICD-10-CM

## 2023-12-01 DIAGNOSIS — F17.200 TOBACCO DEPENDENCE: ICD-10-CM

## 2023-12-07 ENCOUNTER — OFFICE VISIT (OUTPATIENT)
Dept: PRIMARY CARE CLINIC | Facility: CLINIC | Age: 68
End: 2023-12-07
Payer: MEDICARE

## 2023-12-07 VITALS
HEART RATE: 64 BPM | DIASTOLIC BLOOD PRESSURE: 60 MMHG | SYSTOLIC BLOOD PRESSURE: 138 MMHG | OXYGEN SATURATION: 100 % | TEMPERATURE: 99.7 F | BODY MASS INDEX: 27.16 KG/M2 | HEIGHT: 65 IN | WEIGHT: 163 LBS

## 2023-12-07 DIAGNOSIS — S36.00XD INJURY OF SPLEEN, SUBSEQUENT ENCOUNTER: ICD-10-CM

## 2023-12-07 DIAGNOSIS — Z09 HOSPITAL DISCHARGE FOLLOW-UP: Primary | ICD-10-CM

## 2023-12-07 DIAGNOSIS — I10 ESSENTIAL HYPERTENSION: ICD-10-CM

## 2023-12-07 DIAGNOSIS — E78.5 DYSLIPIDEMIA: ICD-10-CM

## 2023-12-07 DIAGNOSIS — S60.00XD CONTUSION OF FINGER OF LEFT HAND, UNSPECIFIED FINGER, SUBSEQUENT ENCOUNTER: ICD-10-CM

## 2023-12-07 DIAGNOSIS — S80.01XA CONTUSION OF RIGHT KNEE, INITIAL ENCOUNTER: ICD-10-CM

## 2023-12-07 DIAGNOSIS — S22.20XD CLOSED FRACTURE OF STERNUM WITH ROUTINE HEALING, UNSPECIFIED PORTION OF STERNUM, SUBSEQUENT ENCOUNTER: ICD-10-CM

## 2023-12-07 DIAGNOSIS — J44.9 COPD, MODERATE (HCC): ICD-10-CM

## 2023-12-07 DIAGNOSIS — K21.9 GASTROESOPHAGEAL REFLUX DISEASE WITHOUT ESOPHAGITIS: ICD-10-CM
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
## 2023-12-07 DIAGNOSIS — Z23 ENCOUNTER FOR IMMUNIZATION: ICD-10-CM

## 2023-12-07 PROCEDURE — G8417 CALC BMI ABV UP PARAM F/U: HCPCS | Performed by: FAMILY MEDICINE

## 2023-12-07 PROCEDURE — 99214 OFFICE O/P EST MOD 30 MIN: CPT | Performed by: FAMILY MEDICINE

## 2023-12-07 PROCEDURE — 3017F COLORECTAL CA SCREEN DOC REV: CPT | Performed by: FAMILY MEDICINE

## 2023-12-07 PROCEDURE — 1123F ACP DISCUSS/DSCN MKR DOCD: CPT | Performed by: FAMILY MEDICINE

## 2023-12-07 PROCEDURE — 3078F DIAST BP <80 MM HG: CPT | Performed by: FAMILY MEDICINE

## 2023-12-07 PROCEDURE — G0008 ADMIN INFLUENZA VIRUS VAC: HCPCS | Performed by: FAMILY MEDICINE

## 2023-12-07 PROCEDURE — 3074F SYST BP LT 130 MM HG: CPT | Performed by: FAMILY MEDICINE

## 2023-12-07 PROCEDURE — 3023F SPIROM DOC REV: CPT | Performed by: FAMILY MEDICINE

## 2023-12-07 PROCEDURE — 90694 VACC AIIV4 NO PRSRV 0.5ML IM: CPT | Performed by: FAMILY MEDICINE

## 2023-12-07 PROCEDURE — G8484 FLU IMMUNIZE NO ADMIN: HCPCS | Performed by: FAMILY MEDICINE

## 2023-12-07 PROCEDURE — 4004F PT TOBACCO SCREEN RCVD TLK: CPT | Performed by: FAMILY MEDICINE

## 2023-12-07 PROCEDURE — G8427 DOCREV CUR MEDS BY ELIG CLIN: HCPCS | Performed by: FAMILY MEDICINE

## 2023-12-07 RX ORDER — METHOCARBAMOL 750 MG/1
750 TABLET, FILM COATED ORAL 4 TIMES DAILY
COMMUNITY

## 2023-12-07 RX ORDER — METHOCARBAMOL 750 MG/1
750 TABLET, FILM COATED ORAL 3 TIMES DAILY PRN
Qty: 9 TABLET | Refills: 0 | Status: SHIPPED | OUTPATIENT
Start: 2023-12-07 | End: 2024-01-06

## 2023-12-07 RX ORDER — ACETAMINOPHEN 500 MG
500 TABLET ORAL EVERY 6 HOURS PRN
COMMUNITY

## 2023-12-07 RX ORDER — GABAPENTIN 300 MG/1
300 CAPSULE ORAL 3 TIMES DAILY
Qty: 90 CAPSULE | Refills: 0 | Status: SHIPPED | OUTPATIENT
Start: 2023-12-07 | End: 2024-01-06

## 2023-12-07 RX ORDER — LIDOCAINE 4 G/G
1 PATCH TOPICAL DAILY
COMMUNITY
End: 2023-12-07 | Stop reason: SDUPTHER

## 2023-12-07 RX ORDER — GABAPENTIN 300 MG/1
300 CAPSULE ORAL 3 TIMES DAILY
COMMUNITY
End: 2023-12-07 | Stop reason: SDUPTHER

## 2023-12-07 RX ORDER — LIDOCAINE 4 G/G
1 PATCH TOPICAL DAILY
Qty: 30 PATCH | Refills: 0 | Status: SHIPPED | OUTPATIENT
Start: 2023-12-07 | End: 2024-01-06

## 2023-12-14 PROBLEM — S22.20XD CLOSED FRACTURE OF STERNUM WITH ROUTINE HEALING: Status: ACTIVE | Noted: 2023-12-14

## 2023-12-14 PROBLEM — Z09 HOSPITAL DISCHARGE FOLLOW-UP: Status: ACTIVE | Noted: 2023-12-14

## 2023-12-14 PROBLEM — S80.01XA CONTUSION OF RIGHT KNEE: Status: ACTIVE | Noted: 2023-12-14

## 2023-12-14 PROBLEM — S60.00XA CONTUSION OF FINGER OF LEFT HAND: Status: ACTIVE | Noted: 2023-12-14

## 2023-12-14 PROBLEM — S36.00XA INJURY TO SPLEEN: Status: ACTIVE | Noted: 2023-12-14
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
## 2023-12-18 PROBLEM — R07.9 CHEST PAIN: Status: ACTIVE | Noted: 2023-12-18

## 2024-01-02 ENCOUNTER — TELEPHONE (OUTPATIENT)
Dept: PRIMARY CARE CLINIC | Facility: CLINIC | Age: 69
End: 2024-01-02
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
## 2024-01-11 ENCOUNTER — OFFICE VISIT (OUTPATIENT)
Dept: BEHAVIORAL/MENTAL HEALTH CLINIC | Age: 69
End: 2024-01-11
Payer: MEDICARE

## 2024-01-11 ENCOUNTER — OFFICE VISIT (OUTPATIENT)
Dept: PRIMARY CARE CLINIC | Facility: CLINIC | Age: 69
End: 2024-01-11

## 2024-01-11 VITALS
BODY MASS INDEX: 27.66 KG/M2 | HEIGHT: 65 IN | TEMPERATURE: 98.2 F | SYSTOLIC BLOOD PRESSURE: 120 MMHG | WEIGHT: 166 LBS | DIASTOLIC BLOOD PRESSURE: 61 MMHG | OXYGEN SATURATION: 96 % | HEART RATE: 61 BPM

## 2024-01-11 VITALS
HEIGHT: 65 IN | HEART RATE: 60 BPM | OXYGEN SATURATION: 95 % | TEMPERATURE: 98.3 F | SYSTOLIC BLOOD PRESSURE: 136 MMHG | DIASTOLIC BLOOD PRESSURE: 70 MMHG | BODY MASS INDEX: 27.66 KG/M2 | WEIGHT: 166 LBS

## 2024-01-11 DIAGNOSIS — K21.9 GASTROESOPHAGEAL REFLUX DISEASE WITHOUT ESOPHAGITIS: ICD-10-CM

## 2024-01-11 DIAGNOSIS — Z12.5 SCREENING FOR PROSTATE CANCER: ICD-10-CM

## 2024-01-11 DIAGNOSIS — Z71.3 DIETARY COUNSELING: ICD-10-CM

## 2024-01-11 DIAGNOSIS — F33.42 RECURRENT MAJOR DEPRESSIVE DISORDER, IN FULL REMISSION (HCC): Primary | ICD-10-CM

## 2024-01-11 DIAGNOSIS — N40.0 BENIGN PROSTATIC HYPERPLASIA WITHOUT LOWER URINARY TRACT SYMPTOMS: ICD-10-CM

## 2024-01-11 DIAGNOSIS — M25.561 ACUTE PAIN OF RIGHT KNEE: ICD-10-CM

## 2024-01-11 DIAGNOSIS — F51.05 INSOMNIA DUE TO MENTAL DISORDER: ICD-10-CM

## 2024-01-11 DIAGNOSIS — F33.41 MAJOR DEPRESSIVE DISORDER, RECURRENT, IN PARTIAL REMISSION (HCC): ICD-10-CM

## 2024-01-11 DIAGNOSIS — E78.5 DYSLIPIDEMIA: ICD-10-CM

## 2024-01-11 DIAGNOSIS — E78.1 HYPERTRIGLYCERIDEMIA: ICD-10-CM

## 2024-01-11 DIAGNOSIS — G89.29 CHRONIC BACK PAIN, UNSPECIFIED BACK LOCATION, UNSPECIFIED BACK PAIN LATERALITY: ICD-10-CM

## 2024-01-11 DIAGNOSIS — F41.1 GENERALIZED ANXIETY DISORDER: ICD-10-CM

## 2024-01-11 DIAGNOSIS — M54.9 CHRONIC BACK PAIN, UNSPECIFIED BACK LOCATION, UNSPECIFIED BACK PAIN LATERALITY: ICD-10-CM

## 2024-01-11 DIAGNOSIS — S22.20XD CLOSED FRACTURE OF STERNUM WITH ROUTINE HEALING, UNSPECIFIED PORTION OF STERNUM, SUBSEQUENT ENCOUNTER: ICD-10-CM

## 2024-01-11 DIAGNOSIS — J44.9 COPD, MODERATE (HCC): ICD-10-CM

## 2024-01-11 DIAGNOSIS — M79.642 LEFT HAND PAIN: ICD-10-CM

## 2024-01-11 DIAGNOSIS — S36.00XD INJURY OF SPLEEN, SUBSEQUENT ENCOUNTER: ICD-10-CM

## 2024-01-11 DIAGNOSIS — I10 ESSENTIAL HYPERTENSION: Primary | ICD-10-CM

## 2024-01-11 DIAGNOSIS — E66.3 OVERWEIGHT: ICD-10-CM

## 2024-01-11 PROCEDURE — 99214 OFFICE O/P EST MOD 30 MIN: CPT | Performed by: PSYCHIATRY & NEUROLOGY

## 2024-01-11 PROCEDURE — G8427 DOCREV CUR MEDS BY ELIG CLIN: HCPCS | Performed by: PSYCHIATRY & NEUROLOGY

## 2024-01-11 PROCEDURE — 1123F ACP DISCUSS/DSCN MKR DOCD: CPT | Performed by: PSYCHIATRY & NEUROLOGY

## 2024-01-11 PROCEDURE — 3078F DIAST BP <80 MM HG: CPT | Performed by: PSYCHIATRY & NEUROLOGY

## 2024-01-11 PROCEDURE — 4004F PT TOBACCO SCREEN RCVD TLK: CPT | Performed by: PSYCHIATRY & NEUROLOGY

## 2024-01-11 PROCEDURE — 3017F COLORECTAL CA SCREEN DOC REV: CPT | Performed by: PSYCHIATRY & NEUROLOGY

## 2024-01-11 PROCEDURE — G8417 CALC BMI ABV UP PARAM F/U: HCPCS | Performed by: PSYCHIATRY & NEUROLOGY

## 2024-01-11 PROCEDURE — G8484 FLU IMMUNIZE NO ADMIN: HCPCS | Performed by: PSYCHIATRY & NEUROLOGY

## 2024-01-11 PROCEDURE — 3075F SYST BP GE 130 - 139MM HG: CPT | Performed by: PSYCHIATRY & NEUROLOGY

## 2024-01-11 RX ORDER — BUSPIRONE HYDROCHLORIDE 10 MG/1
10 TABLET ORAL 3 TIMES DAILY
Qty: 270 TABLET | Refills: 0 | Status: SHIPPED | OUTPATIENT
Start: 2024-01-11

## 2024-01-11 RX ORDER — TRAZODONE HYDROCHLORIDE 100 MG/1
100 TABLET ORAL NIGHTLY
Qty: 90 TABLET | Refills: 0 | Status: SHIPPED | OUTPATIENT
Start: 2024-01-11

## 2024-01-11 RX ORDER — SERTRALINE HYDROCHLORIDE 100 MG/1
100 TABLET, FILM COATED ORAL EVERY MORNING
Qty: 90 TABLET | Refills: 0 | Status: SHIPPED | OUTPATIENT
Start: 2024-01-11

## 2024-01-11 ASSESSMENT — PATIENT HEALTH QUESTIONNAIRE - PHQ9
7. TROUBLE CONCENTRATING ON THINGS, SUCH AS READING THE NEWSPAPER OR WATCHING TELEVISION: 0
SUM OF ALL RESPONSES TO PHQ9 QUESTIONS 1 & 2: 2
4. FEELING TIRED OR HAVING LITTLE ENERGY: 0
6. FEELING BAD ABOUT YOURSELF - OR THAT YOU ARE A FAILURE OR HAVE LET YOURSELF OR YOUR FAMILY DOWN: 0
SUM OF ALL RESPONSES TO PHQ QUESTIONS 1-9: 2
SUM OF ALL RESPONSES TO PHQ QUESTIONS 1-9: 5
SUM OF ALL RESPONSES TO PHQ QUESTIONS 1-9: 5
8. MOVING OR SPEAKING SO SLOWLY THAT OTHER PEOPLE COULD HAVE NOTICED. OR THE OPPOSITE, BEING SO FIGETY OR RESTLESS THAT YOU HAVE BEEN MOVING AROUND A LOT MORE THAN USUAL: 0
6. FEELING BAD ABOUT YOURSELF - OR THAT YOU ARE A FAILURE OR HAVE LET YOURSELF OR YOUR FAMILY DOWN: 0
1. LITTLE INTEREST OR PLEASURE IN DOING THINGS: 2
2. FEELING DOWN, DEPRESSED OR HOPELESS: 2
10. IF YOU CHECKED OFF ANY PROBLEMS, HOW DIFFICULT HAVE THESE PROBLEMS MADE IT FOR YOU TO DO YOUR WORK, TAKE CARE OF THINGS AT HOME, OR GET ALONG WITH OTHER PEOPLE: 0
SUM OF ALL RESPONSES TO PHQ QUESTIONS 1-9: 2
3. TROUBLE FALLING OR STAYING ASLEEP: 0
SUM OF ALL RESPONSES TO PHQ QUESTIONS 1-9: 5
9. THOUGHTS THAT YOU WOULD BE BETTER OFF DEAD, OR OF HURTING YOURSELF: 0
SUM OF ALL RESPONSES TO PHQ QUESTIONS 1-9: 2
2. FEELING DOWN, DEPRESSED OR HOPELESS: 1
8. MOVING OR SPEAKING SO SLOWLY THAT OTHER PEOPLE COULD HAVE NOTICED. OR THE OPPOSITE, BEING SO FIGETY OR RESTLESS THAT YOU HAVE BEEN MOVING AROUND A LOT MORE THAN USUAL: 0
4. FEELING TIRED OR HAVING LITTLE ENERGY: 1
10. IF YOU CHECKED OFF ANY PROBLEMS, HOW DIFFICULT HAVE THESE PROBLEMS MADE IT FOR YOU TO DO YOUR WORK, TAKE CARE OF THINGS AT HOME, OR GET ALONG WITH OTHER PEOPLE: 1
7. TROUBLE CONCENTRATING ON THINGS, SUCH AS READING THE NEWSPAPER OR WATCHING TELEVISION: 0
5. POOR APPETITE OR OVEREATING: 0
3. TROUBLE FALLING OR STAYING ASLEEP: 0
1. LITTLE INTEREST OR PLEASURE IN DOING THINGS: 1
SUM OF ALL RESPONSES TO PHQ QUESTIONS 1-9: 2
SUM OF ALL RESPONSES TO PHQ9 QUESTIONS 1 & 2: 4
SUM OF ALL RESPONSES TO PHQ QUESTIONS 1-9: 5
5. POOR APPETITE OR OVEREATING: 0
9. THOUGHTS THAT YOU WOULD BE BETTER OFF DEAD, OR OF HURTING YOURSELF: 0

## 2024-01-11 ASSESSMENT — ANXIETY QUESTIONNAIRES
IF YOU CHECKED OFF ANY PROBLEMS ON THIS QUESTIONNAIRE, HOW DIFFICULT HAVE THESE PROBLEMS MADE IT FOR YOU TO DO YOUR WORK, TAKE CARE OF THINGS AT HOME, OR GET ALONG WITH OTHER PEOPLE: SOMEWHAT DIFFICULT
GAD7 TOTAL SCORE: 2
5. BEING SO RESTLESS THAT IT IS HARD TO SIT STILL: 0
3. WORRYING TOO MUCH ABOUT DIFFERENT THINGS: 0
7. FEELING AFRAID AS IF SOMETHING AWFUL MIGHT HAPPEN: 0
4. TROUBLE RELAXING: 1
1. FEELING NERVOUS, ANXIOUS, OR ON EDGE: 1
2. NOT BEING ABLE TO STOP OR CONTROL WORRYING: 0
6. BECOMING EASILY ANNOYED OR IRRITABLE: 0

## 2024-01-11 NOTE — PROGRESS NOTES
Patient:  Ezequiel Mckeon  Age:  68 y.o.  :  1955     SEX:  male MRN:  [id number]     RACE:  /      SEEN:  [x]  PATIENT  []  SPOUSE []  OTHER:                  2024    10:44 AM 2024     9:44 AM 10/10/2023    11:20 AM   PHQ-9    Little interest or pleasure in doing things 2 1 0   Feeling down, depressed, or hopeless 2 1 0   Trouble falling or staying asleep, or sleeping too much 0 0 0   Feeling tired or having little energy 1 0 2   Poor appetite or overeating 0 0 0   Feeling bad about yourself - or that you are a failure or have let yourself or your family down 0 0 0   Trouble concentrating on things, such as reading the newspaper or watching television 0 0 0   Moving or speaking so slowly that other people could have noticed. Or the opposite - being so fidgety or restless that you have been moving around a lot more than usual 0 0 0   Thoughts that you would be better off dead, or of hurting yourself in some way 0 0 0   PHQ-2 Score 4 2 0   PHQ-9 Total Score 5 2 2   If you checked off any problems, how difficult have these problems made it for you to do your work, take care of things at home, or get along with other people? 1 0 0           2024    10:44 AM 10/10/2023    11:20 AM 12/15/2022    11:03 AM   VIDA-7 SCREENING   Feeling nervous, anxious, or on edge Several days Several days Several days   Not being able to stop or control worrying Not at all Not at all Not at all   Worrying too much about different things Not at all Not at all Not at all   Trouble relaxing Several days Not at all Not at all   Being so restless that it is hard to sit still Not at all Not at all More than half the days   Becoming easily annoyed or irritable Not at all Not at all Not at all   Feeling afraid as if something awful might happen Not at all Not at all Not at all   VIDA-7 Total Score 2 1 3   How difficult have these problems made it for you to do your work, take care of things at home, or

## 2024-01-12 PROBLEM — M79.642 LEFT HAND PAIN: Status: ACTIVE | Noted: 2024-01-12

## 2024-01-12 PROBLEM — M25.561 ACUTE PAIN OF RIGHT KNEE: Status: ACTIVE | Noted: 2024-01-12

## 2024-01-12 RX ORDER — ROSUVASTATIN CALCIUM 40 MG/1
40 TABLET, COATED ORAL DAILY
Qty: 90 TABLET | Refills: 0 | Status: SHIPPED | OUTPATIENT
Start: 2024-01-12

## 2024-01-12 RX ORDER — OMEPRAZOLE 40 MG/1
40 CAPSULE, DELAYED RELEASE ORAL DAILY
Qty: 90 CAPSULE | Refills: 0 | Status: SHIPPED | OUTPATIENT
Start: 2024-01-12

## 2024-01-12 RX ORDER — TAMSULOSIN HYDROCHLORIDE 0.4 MG/1
0.4 CAPSULE ORAL
Qty: 90 CAPSULE | Refills: 0 | Status: SHIPPED | OUTPATIENT
Start: 2024-01-12

## 2024-01-12 RX ORDER — ATENOLOL 25 MG/1
25 TABLET ORAL DAILY
Qty: 90 TABLET | Refills: 0 | Status: SHIPPED | OUTPATIENT
Start: 2024-01-12

## 2024-01-12 ASSESSMENT — ENCOUNTER SYMPTOMS
EYES NEGATIVE: 1
GASTROINTESTINAL NEGATIVE: 1
ALLERGIC/IMMUNOLOGIC NEGATIVE: 1
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
## 2024-01-13 PROBLEM — Z09 HOSPITAL DISCHARGE FOLLOW-UP: Status: RESOLVED | Noted: 2023-12-14 | Resolved: 2024-01-13

## 2024-01-29 ENCOUNTER — OFFICE VISIT (OUTPATIENT)
Dept: PRIMARY CARE CLINIC | Facility: CLINIC | Age: 69
End: 2024-01-29
Payer: MEDICARE

## 2024-01-29 VITALS
BODY MASS INDEX: 27.66 KG/M2 | WEIGHT: 166 LBS | OXYGEN SATURATION: 95 % | HEART RATE: 65 BPM | HEIGHT: 65 IN | TEMPERATURE: 98 F | SYSTOLIC BLOOD PRESSURE: 107 MMHG | DIASTOLIC BLOOD PRESSURE: 57 MMHG

## 2024-01-29 DIAGNOSIS — K62.5 RECTAL BLEED: ICD-10-CM

## 2024-01-29 DIAGNOSIS — Z71.3 DIETARY COUNSELING: ICD-10-CM

## 2024-01-29 DIAGNOSIS — K52.9 CHRONIC DIARRHEA: ICD-10-CM

## 2024-01-29 DIAGNOSIS — R10.9 ABDOMINAL PAIN, UNSPECIFIED ABDOMINAL LOCATION: Primary | ICD-10-CM

## 2024-01-29 DIAGNOSIS — K59.00 CONSTIPATION, UNSPECIFIED CONSTIPATION TYPE: ICD-10-CM

## 2024-01-29 DIAGNOSIS — S36.00XD INJURY OF SPLEEN, SUBSEQUENT ENCOUNTER: ICD-10-CM

## 2024-01-29 DIAGNOSIS — K21.9 GASTROESOPHAGEAL REFLUX DISEASE WITHOUT ESOPHAGITIS: ICD-10-CM

## 2024-01-29 PROCEDURE — 3078F DIAST BP <80 MM HG: CPT | Performed by: FAMILY MEDICINE

## 2024-01-29 PROCEDURE — 3017F COLORECTAL CA SCREEN DOC REV: CPT | Performed by: FAMILY MEDICINE

## 2024-01-29 PROCEDURE — G8417 CALC BMI ABV UP PARAM F/U: HCPCS | Performed by: FAMILY MEDICINE

## 2024-01-29 PROCEDURE — 4004F PT TOBACCO SCREEN RCVD TLK: CPT | Performed by: FAMILY MEDICINE

## 2024-01-29 PROCEDURE — G8484 FLU IMMUNIZE NO ADMIN: HCPCS | Performed by: FAMILY MEDICINE

## 2024-01-29 PROCEDURE — G8427 DOCREV CUR MEDS BY ELIG CLIN: HCPCS | Performed by: FAMILY MEDICINE

## 2024-01-29 PROCEDURE — 99214 OFFICE O/P EST MOD 30 MIN: CPT | Performed by: FAMILY MEDICINE

## 2024-01-29 PROCEDURE — 1123F ACP DISCUSS/DSCN MKR DOCD: CPT | Performed by: FAMILY MEDICINE

## 2024-01-29 PROCEDURE — 3074F SYST BP LT 130 MM HG: CPT | Performed by: FAMILY MEDICINE

## 2024-01-29 RX ORDER — CIPROFLOXACIN 500 MG/1
500 TABLET, FILM COATED ORAL 2 TIMES DAILY
Qty: 14 TABLET | Refills: 0 | Status: SHIPPED | OUTPATIENT
Start: 2024-01-29 | End: 2024-02-05

## 2024-01-29 RX ORDER — POLYETHYLENE GLYCOL 3350 17 G/17G
17 POWDER, FOR SOLUTION ORAL DAILY
Qty: 600 EACH | Refills: 0 | Status: SHIPPED | OUTPATIENT
Start: 2024-01-29 | End: 2024-02-28

## 2024-01-29 ASSESSMENT — ENCOUNTER SYMPTOMS
ALLERGIC/IMMUNOLOGIC NEGATIVE: 1
EYES NEGATIVE: 1
ABDOMINAL PAIN: 1
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
## 2024-02-12 ENCOUNTER — OFFICE VISIT (OUTPATIENT)
Dept: PULMONOLOGY | Age: 69
End: 2024-02-12
Payer: MEDICARE

## 2024-02-12 VITALS
RESPIRATION RATE: 16 BRPM | WEIGHT: 168 LBS | HEIGHT: 65 IN | SYSTOLIC BLOOD PRESSURE: 114 MMHG | TEMPERATURE: 98.4 F | BODY MASS INDEX: 27.99 KG/M2 | DIASTOLIC BLOOD PRESSURE: 64 MMHG | OXYGEN SATURATION: 96 % | HEART RATE: 74 BPM

## 2024-02-12 DIAGNOSIS — F17.200 TOBACCO DEPENDENCE: ICD-10-CM

## 2024-02-12 DIAGNOSIS — R91.8 LUNG NODULES: ICD-10-CM

## 2024-02-12 DIAGNOSIS — J44.9 COPD, MODERATE (HCC): Primary | ICD-10-CM

## 2024-02-12 PROCEDURE — G8484 FLU IMMUNIZE NO ADMIN: HCPCS | Performed by: INTERNAL MEDICINE

## 2024-02-12 PROCEDURE — 3074F SYST BP LT 130 MM HG: CPT | Performed by: INTERNAL MEDICINE

## 2024-02-12 PROCEDURE — 3078F DIAST BP <80 MM HG: CPT | Performed by: INTERNAL MEDICINE

## 2024-02-12 PROCEDURE — 90677 PCV20 VACCINE IM: CPT | Performed by: INTERNAL MEDICINE

## 2024-02-12 PROCEDURE — 99214 OFFICE O/P EST MOD 30 MIN: CPT | Performed by: INTERNAL MEDICINE

## 2024-02-12 PROCEDURE — 3023F SPIROM DOC REV: CPT | Performed by: INTERNAL MEDICINE

## 2024-02-12 PROCEDURE — G0009 ADMIN PNEUMOCOCCAL VACCINE: HCPCS | Performed by: INTERNAL MEDICINE

## 2024-02-12 PROCEDURE — G8427 DOCREV CUR MEDS BY ELIG CLIN: HCPCS | Performed by: INTERNAL MEDICINE

## 2024-02-12 PROCEDURE — 1123F ACP DISCUSS/DSCN MKR DOCD: CPT | Performed by: INTERNAL MEDICINE

## 2024-02-12 PROCEDURE — G8417 CALC BMI ABV UP PARAM F/U: HCPCS | Performed by: INTERNAL MEDICINE

## 2024-02-12 PROCEDURE — 3017F COLORECTAL CA SCREEN DOC REV: CPT | Performed by: INTERNAL MEDICINE

## 2024-02-12 PROCEDURE — 4004F PT TOBACCO SCREEN RCVD TLK: CPT | Performed by: INTERNAL MEDICINE
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
## 2024-02-22 ENCOUNTER — HOSPITAL ENCOUNTER (OUTPATIENT)
Dept: CT IMAGING | Age: 69
Discharge: HOME | End: 2024-02-22
Attending: FAMILY MEDICINE
Payer: MEDICARE

## 2024-02-22 DIAGNOSIS — K59.00 CONSTIPATION, UNSPECIFIED CONSTIPATION TYPE: ICD-10-CM

## 2024-02-22 DIAGNOSIS — R10.9 ABDOMINAL PAIN, UNSPECIFIED ABDOMINAL LOCATION: ICD-10-CM

## 2024-02-22 DIAGNOSIS — S36.00XD INJURY OF SPLEEN, SUBSEQUENT ENCOUNTER: ICD-10-CM

## 2024-02-22 DIAGNOSIS — K62.5 RECTAL BLEED: ICD-10-CM

## 2024-02-22 LAB — CREAT BLD-MCNC: 0.5 MG/DL (ref 0.8–1.5)

## 2024-02-22 PROCEDURE — 82565 ASSAY OF CREATININE: CPT

## 2024-02-22 PROCEDURE — 6360000004 HC RX CONTRAST MEDICATION: Performed by: FAMILY MEDICINE

## 2024-02-22 PROCEDURE — 74177 CT ABD & PELVIS W/CONTRAST: CPT

## 2024-02-22 RX ADMIN — DIATRIZOATE MEGLUMINE AND DIATRIZOATE SODIUM 15 ML: 660; 100 LIQUID ORAL; RECTAL at 10:16

## 2024-02-22 RX ADMIN — IOPAMIDOL 100 ML: 755 INJECTION, SOLUTION INTRAVENOUS at 10:16

## 2024-04-11 ENCOUNTER — OFFICE VISIT (OUTPATIENT)
Dept: BEHAVIORAL/MENTAL HEALTH CLINIC | Age: 69
End: 2024-04-11
Payer: MEDICARE

## 2024-04-11 ENCOUNTER — OFFICE VISIT (OUTPATIENT)
Dept: PRIMARY CARE CLINIC | Facility: CLINIC | Age: 69
End: 2024-04-11
Payer: MEDICARE

## 2024-04-11 VITALS
BODY MASS INDEX: 27.66 KG/M2 | TEMPERATURE: 98.2 F | OXYGEN SATURATION: 98 % | HEIGHT: 65 IN | HEART RATE: 66 BPM | DIASTOLIC BLOOD PRESSURE: 62 MMHG | SYSTOLIC BLOOD PRESSURE: 128 MMHG | WEIGHT: 166 LBS

## 2024-04-11 VITALS
DIASTOLIC BLOOD PRESSURE: 64 MMHG | SYSTOLIC BLOOD PRESSURE: 110 MMHG | HEART RATE: 69 BPM | BODY MASS INDEX: 27.66 KG/M2 | WEIGHT: 166 LBS | TEMPERATURE: 98.1 F | OXYGEN SATURATION: 97 % | HEIGHT: 65 IN

## 2024-04-11 DIAGNOSIS — F33.42 RECURRENT MAJOR DEPRESSIVE DISORDER, IN FULL REMISSION (HCC): Primary | ICD-10-CM

## 2024-04-11 DIAGNOSIS — Z00.00 MEDICARE ANNUAL WELLNESS VISIT, SUBSEQUENT: Primary | ICD-10-CM

## 2024-04-11 DIAGNOSIS — N40.0 BENIGN PROSTATIC HYPERPLASIA WITHOUT LOWER URINARY TRACT SYMPTOMS: ICD-10-CM

## 2024-04-11 DIAGNOSIS — K21.9 GASTROESOPHAGEAL REFLUX DISEASE WITHOUT ESOPHAGITIS: ICD-10-CM

## 2024-04-11 DIAGNOSIS — I10 ESSENTIAL HYPERTENSION: ICD-10-CM

## 2024-04-11 DIAGNOSIS — J44.9 COPD, MODERATE (HCC): ICD-10-CM

## 2024-04-11 DIAGNOSIS — F41.1 GENERALIZED ANXIETY DISORDER: ICD-10-CM

## 2024-04-11 DIAGNOSIS — E78.1 HYPERTRIGLYCERIDEMIA: ICD-10-CM

## 2024-04-11 DIAGNOSIS — K58.1 IRRITABLE BOWEL SYNDROME WITH CONSTIPATION: ICD-10-CM

## 2024-04-11 DIAGNOSIS — F51.05 INSOMNIA DUE TO MENTAL DISORDER: ICD-10-CM

## 2024-04-11 DIAGNOSIS — E66.3 OVERWEIGHT: ICD-10-CM

## 2024-04-11 DIAGNOSIS — Z71.3 DIETARY COUNSELING: ICD-10-CM

## 2024-04-11 DIAGNOSIS — Z12.5 SCREENING FOR PROSTATE CANCER: ICD-10-CM

## 2024-04-11 DIAGNOSIS — E78.5 DYSLIPIDEMIA: ICD-10-CM

## 2024-04-11 DIAGNOSIS — F33.41 MAJOR DEPRESSIVE DISORDER, RECURRENT, IN PARTIAL REMISSION (HCC): ICD-10-CM

## 2024-04-11 PROCEDURE — 4004F PT TOBACCO SCREEN RCVD TLK: CPT | Performed by: FAMILY MEDICINE

## 2024-04-11 PROCEDURE — 3078F DIAST BP <80 MM HG: CPT | Performed by: PSYCHIATRY & NEUROLOGY

## 2024-04-11 PROCEDURE — 3074F SYST BP LT 130 MM HG: CPT | Performed by: PSYCHIATRY & NEUROLOGY

## 2024-04-11 PROCEDURE — 3074F SYST BP LT 130 MM HG: CPT | Performed by: FAMILY MEDICINE

## 2024-04-11 PROCEDURE — 3023F SPIROM DOC REV: CPT | Performed by: FAMILY MEDICINE

## 2024-04-11 PROCEDURE — 1123F ACP DISCUSS/DSCN MKR DOCD: CPT | Performed by: PSYCHIATRY & NEUROLOGY

## 2024-04-11 PROCEDURE — G8427 DOCREV CUR MEDS BY ELIG CLIN: HCPCS | Performed by: FAMILY MEDICINE

## 2024-04-11 PROCEDURE — 3017F COLORECTAL CA SCREEN DOC REV: CPT | Performed by: FAMILY MEDICINE

## 2024-04-11 PROCEDURE — 4004F PT TOBACCO SCREEN RCVD TLK: CPT | Performed by: PSYCHIATRY & NEUROLOGY

## 2024-04-11 PROCEDURE — G8427 DOCREV CUR MEDS BY ELIG CLIN: HCPCS | Performed by: PSYCHIATRY & NEUROLOGY

## 2024-04-11 PROCEDURE — 99214 OFFICE O/P EST MOD 30 MIN: CPT | Performed by: FAMILY MEDICINE

## 2024-04-11 PROCEDURE — G8417 CALC BMI ABV UP PARAM F/U: HCPCS | Performed by: PSYCHIATRY & NEUROLOGY

## 2024-04-11 PROCEDURE — 3078F DIAST BP <80 MM HG: CPT | Performed by: FAMILY MEDICINE

## 2024-04-11 PROCEDURE — 99213 OFFICE O/P EST LOW 20 MIN: CPT | Performed by: PSYCHIATRY & NEUROLOGY

## 2024-04-11 PROCEDURE — G0439 PPPS, SUBSEQ VISIT: HCPCS | Performed by: FAMILY MEDICINE

## 2024-04-11 PROCEDURE — G8417 CALC BMI ABV UP PARAM F/U: HCPCS | Performed by: FAMILY MEDICINE

## 2024-04-11 PROCEDURE — 1123F ACP DISCUSS/DSCN MKR DOCD: CPT | Performed by: FAMILY MEDICINE

## 2024-04-11 PROCEDURE — 3017F COLORECTAL CA SCREEN DOC REV: CPT | Performed by: PSYCHIATRY & NEUROLOGY

## 2024-04-11 RX ORDER — ATENOLOL 25 MG/1
25 TABLET ORAL DAILY
Qty: 90 TABLET | Refills: 0 | Status: CANCELLED | OUTPATIENT
Start: 2024-04-11

## 2024-04-11 RX ORDER — TRAZODONE HYDROCHLORIDE 100 MG/1
100 TABLET ORAL NIGHTLY
Qty: 90 TABLET | Refills: 0 | Status: SHIPPED | OUTPATIENT
Start: 2024-04-11

## 2024-04-11 RX ORDER — OMEPRAZOLE 40 MG/1
40 CAPSULE, DELAYED RELEASE ORAL DAILY
Qty: 90 CAPSULE | Refills: 1 | Status: SHIPPED | OUTPATIENT
Start: 2024-04-11

## 2024-04-11 RX ORDER — ROSUVASTATIN CALCIUM 40 MG/1
40 TABLET, COATED ORAL DAILY
Qty: 90 TABLET | Refills: 1 | Status: SHIPPED | OUTPATIENT
Start: 2024-04-11

## 2024-04-11 RX ORDER — BUSPIRONE HYDROCHLORIDE 10 MG/1
10 TABLET ORAL 3 TIMES DAILY
Qty: 270 TABLET | Refills: 0 | Status: SHIPPED | OUTPATIENT
Start: 2024-04-11

## 2024-04-11 RX ORDER — TAMSULOSIN HYDROCHLORIDE 0.4 MG/1
0.4 CAPSULE ORAL
Qty: 90 CAPSULE | Refills: 1 | Status: SHIPPED | OUTPATIENT
Start: 2024-04-11

## 2024-04-11 RX ORDER — SERTRALINE HYDROCHLORIDE 100 MG/1
100 TABLET, FILM COATED ORAL EVERY MORNING
Qty: 90 TABLET | Refills: 0 | Status: SHIPPED | OUTPATIENT
Start: 2024-04-11

## 2024-04-11 ASSESSMENT — PATIENT HEALTH QUESTIONNAIRE - PHQ9
4. FEELING TIRED OR HAVING LITTLE ENERGY: SEVERAL DAYS
SUM OF ALL RESPONSES TO PHQ QUESTIONS 1-9: 0
5. POOR APPETITE OR OVEREATING: NOT AT ALL
10. IF YOU CHECKED OFF ANY PROBLEMS, HOW DIFFICULT HAVE THESE PROBLEMS MADE IT FOR YOU TO DO YOUR WORK, TAKE CARE OF THINGS AT HOME, OR GET ALONG WITH OTHER PEOPLE: NOT DIFFICULT AT ALL
SUM OF ALL RESPONSES TO PHQ QUESTIONS 1-9: 1
8. MOVING OR SPEAKING SO SLOWLY THAT OTHER PEOPLE COULD HAVE NOTICED. OR THE OPPOSITE, BEING SO FIGETY OR RESTLESS THAT YOU HAVE BEEN MOVING AROUND A LOT MORE THAN USUAL: NOT AT ALL
2. FEELING DOWN, DEPRESSED OR HOPELESS: NOT AT ALL
6. FEELING BAD ABOUT YOURSELF - OR THAT YOU ARE A FAILURE OR HAVE LET YOURSELF OR YOUR FAMILY DOWN: NOT AT ALL
6. FEELING BAD ABOUT YOURSELF - OR THAT YOU ARE A FAILURE OR HAVE LET YOURSELF OR YOUR FAMILY DOWN: NOT AT ALL
7. TROUBLE CONCENTRATING ON THINGS, SUCH AS READING THE NEWSPAPER OR WATCHING TELEVISION: NOT AT ALL
4. FEELING TIRED OR HAVING LITTLE ENERGY: NOT AT ALL
SUM OF ALL RESPONSES TO PHQ QUESTIONS 1-9: 1
2. FEELING DOWN, DEPRESSED OR HOPELESS: NOT AT ALL
9. THOUGHTS THAT YOU WOULD BE BETTER OFF DEAD, OR OF HURTING YOURSELF: NOT AT ALL
SUM OF ALL RESPONSES TO PHQ QUESTIONS 1-9: 0
SUM OF ALL RESPONSES TO PHQ9 QUESTIONS 1 & 2: 0
SUM OF ALL RESPONSES TO PHQ QUESTIONS 1-9: 0
5. POOR APPETITE OR OVEREATING: NOT AT ALL
9. THOUGHTS THAT YOU WOULD BE BETTER OFF DEAD, OR OF HURTING YOURSELF: NOT AT ALL
3. TROUBLE FALLING OR STAYING ASLEEP: NOT AT ALL
1. LITTLE INTEREST OR PLEASURE IN DOING THINGS: NOT AT ALL
7. TROUBLE CONCENTRATING ON THINGS, SUCH AS READING THE NEWSPAPER OR WATCHING TELEVISION: NOT AT ALL
1. LITTLE INTEREST OR PLEASURE IN DOING THINGS: NOT AT ALL
3. TROUBLE FALLING OR STAYING ASLEEP: NOT AT ALL
8. MOVING OR SPEAKING SO SLOWLY THAT OTHER PEOPLE COULD HAVE NOTICED. OR THE OPPOSITE, BEING SO FIGETY OR RESTLESS THAT YOU HAVE BEEN MOVING AROUND A LOT MORE THAN USUAL: NOT AT ALL
SUM OF ALL RESPONSES TO PHQ QUESTIONS 1-9: 0
10. IF YOU CHECKED OFF ANY PROBLEMS, HOW DIFFICULT HAVE THESE PROBLEMS MADE IT FOR YOU TO DO YOUR WORK, TAKE CARE OF THINGS AT HOME, OR GET ALONG WITH OTHER PEOPLE: NOT DIFFICULT AT ALL
SUM OF ALL RESPONSES TO PHQ9 QUESTIONS 1 & 2: 0

## 2024-04-11 ASSESSMENT — ANXIETY QUESTIONNAIRES
5. BEING SO RESTLESS THAT IT IS HARD TO SIT STILL: NOT AT ALL
7. FEELING AFRAID AS IF SOMETHING AWFUL MIGHT HAPPEN: NOT AT ALL
4. TROUBLE RELAXING: SEVERAL DAYS
1. FEELING NERVOUS, ANXIOUS, OR ON EDGE: NOT AT ALL
3. WORRYING TOO MUCH ABOUT DIFFERENT THINGS: NOT AT ALL
2. NOT BEING ABLE TO STOP OR CONTROL WORRYING: NOT AT ALL
6. BECOMING EASILY ANNOYED OR IRRITABLE: NOT AT ALL
GAD7 TOTAL SCORE: 1

## 2024-04-11 ASSESSMENT — ENCOUNTER SYMPTOMS
EYES NEGATIVE: 1
GASTROINTESTINAL NEGATIVE: 1
ALLERGIC/IMMUNOLOGIC NEGATIVE: 1

## 2024-04-11 NOTE — PROGRESS NOTES
Patient:  Ezequiel Mckeon  Age:  68 y.o.  :  1955     SEX:  male MRN:  [id number]     RACE:  /      SEEN:  [x]  PATIENT  []  SPOUSE []  OTHER:                  2024    11:50 AM 2024    10:47 AM 2024    10:44 AM   PHQ-9    Little interest or pleasure in doing things 0 0 2   Feeling down, depressed, or hopeless 0 0 2   Trouble falling or staying asleep, or sleeping too much 0 0 0   Feeling tired or having little energy 1 0 1   Poor appetite or overeating 0 0 0   Feeling bad about yourself - or that you are a failure or have let yourself or your family down 0 0 0   Trouble concentrating on things, such as reading the newspaper or watching television 0 0 0   Moving or speaking so slowly that other people could have noticed. Or the opposite - being so fidgety or restless that you have been moving around a lot more than usual 0 0 0   Thoughts that you would be better off dead, or of hurting yourself in some way 0 0 0   PHQ-2 Score 0 0 4   PHQ-9 Total Score 1 0 5   If you checked off any problems, how difficult have these problems made it for you to do your work, take care of things at home, or get along with other people? 0 0 1           2024    11:51 AM 2024    10:44 AM 10/10/2023    11:20 AM   VIDA-7 SCREENING   Feeling nervous, anxious, or on edge Not at all Several days Several days   Not being able to stop or control worrying Not at all Not at all Not at all   Worrying too much about different things Not at all Not at all Not at all   Trouble relaxing Several days Several days Not at all   Being so restless that it is hard to sit still Not at all Not at all Not at all   Becoming easily annoyed or irritable Not at all Not at all Not at all   Feeling afraid as if something awful might happen Not at all Not at all Not at all   VIDA-7 Total Score 1 2 1   How difficult have these problems made it for you to do your work, take care of things at home, or get along with

## 2024-04-11 NOTE — PROGRESS NOTES
on file     Unstable Housing in the Last Year: No       No Known Allergies    Current Outpatient Medications   Medication Sig Dispense Refill    omeprazole (PRILOSEC) 40 MG delayed release capsule Take 1 capsule by mouth daily 90 capsule 1    rosuvastatin (CRESTOR) 40 MG tablet Take 1 tablet by mouth daily 90 tablet 1    tamsulosin (FLOMAX) 0.4 MG capsule Take 1 capsule by mouth nightly 90 capsule 1    atenolol (TENORMIN) 25 MG tablet Take 1 tablet by mouth daily 90 tablet 0    acetaminophen (TYLENOL) 500 MG tablet Take 1 tablet by mouth every 6 hours as needed for Pain      ipratropium 0.5 mg-albuterol 2.5 mg (DUONEB) 0.5-2.5 (3) MG/3ML SOLN nebulizer solution Inhale 3 mLs into the lungs 3 times daily 360 mL 11    Ascorbic Acid (VITAMIN C) 1000 MG tablet Take 1 tablet by mouth daily      Multiple Vitamin (MULTIVITAMINS PO) Take 1 tablet by mouth daily      Omega-3 Fatty Acids (FISH OIL) 1000 MG capsule Take 1 capsule by mouth daily Indications: 1000mg      aspirin 81 MG EC tablet Take 1 tablet by mouth daily      busPIRone (BUSPAR) 10 MG tablet Take 1 tablet by mouth 3 times daily 270 tablet 0    sertraline (ZOLOFT) 100 MG tablet Take 1 tablet by mouth every morning 90 tablet 0    traZODone (DESYREL) 100 MG tablet Take 1 tablet by mouth nightly 90 tablet 0     No current facility-administered medications for this visit.         Review of Systems   Constitutional:  Positive for fatigue.   HENT: Negative.     Eyes: Negative.    Gastrointestinal: Negative.    Endocrine: Negative.    Genitourinary: Negative.    Musculoskeletal: Negative.    Skin: Negative.    Allergic/Immunologic: Negative.    Neurological: Negative.    Hematological: Negative.    Psychiatric/Behavioral: Negative.            Objective:    /64 (Site: Left Upper Arm, Position: Sitting, Cuff Size: Medium Adult)   Pulse 69   Temp 98.1 °F (36.7 °C) (Temporal)   Ht 1.651 m (5' 5\")   Wt 75.3 kg (166 lb)   SpO2 97%   BMI 27.62 kg/m²     Physical

## 2024-05-11 PROBLEM — Z12.5 SCREENING FOR PROSTATE CANCER: Status: RESOLVED | Noted: 2022-12-16 | Resolved: 2024-05-11

## 2024-05-11 PROBLEM — Z00.00 MEDICARE ANNUAL WELLNESS VISIT, SUBSEQUENT: Status: RESOLVED | Noted: 2020-02-11 | Resolved: 2024-05-11

## 2024-08-14 NOTE — PROGRESS NOTES
Name:  Ezequiel Mckeon  YOB: 1955   MRN: [id number]      Office Visit: 8/16/2024        ASSESSMENT AND PLAN:  (Medical Decision Making)    Impression: This is 67 years old  male with history of moderate COPD and lung nodules.  He was accompanied by his friend and he continues to smoke.  He was counseled about smoking cessation to prevent further lung damage    1.. COPD, moderate (HCC)  Spirometry today was quite similar to the last office visit.  This was reviewed with him in detail.  Encourage smoking cessation and discussed with him the importance of smoking cessation regarding preserving his lung function..  Will continue on DuoNeb 3 times daily and as needed and we will renew his medication for that.  I suggested to him that he can use that up to 4 times a day if needed.    His pneumonia vaccine is up to date since he received Prevnar 20 last office visit.    2. Tobacco dependence  Discussed smoking cessation in detail to assist    3. Lung nodules  Right upper lobe nodule and stable most recent CT of the chest.  We will schedule follow-up annual low-dose CT of the chest as recommended.    Orders Placed This Encounter   Procedures    Spirometry Without Bronchodilator     Orders Placed This Encounter    Spirometry Without Bronchodilator    ipratropium 0.5 mg-albuterol 2.5 mg (DUONEB) 0.5-2.5 (3) MG/3ML SOLN nebulizer solution     Sig: Inhale 3 mLs into the lungs every 4 hours as needed for Shortness of Breath     Dispense:  360 mL     Refill:  11       Follow-up and Dispositions    Return in about 6 months (around 2/16/2025).             Monet Marcano MD    No specialty comments available.    Total time for encounter on day of encounter was 35 minutes.  This time includes chart prep, review of tests/procedures, review of other provider's notes, documentation and counseling patient regarding disease process and medications.

## 2024-08-16 ENCOUNTER — OFFICE VISIT (OUTPATIENT)
Dept: PULMONOLOGY | Age: 69
End: 2024-08-16

## 2024-08-16 VITALS
TEMPERATURE: 97.6 F | DIASTOLIC BLOOD PRESSURE: 68 MMHG | HEIGHT: 65 IN | HEART RATE: 70 BPM | OXYGEN SATURATION: 96 % | BODY MASS INDEX: 26.16 KG/M2 | RESPIRATION RATE: 18 BRPM | SYSTOLIC BLOOD PRESSURE: 128 MMHG | WEIGHT: 157 LBS

## 2024-08-16 DIAGNOSIS — R91.8 LUNG NODULES: ICD-10-CM

## 2024-08-16 DIAGNOSIS — Z87.891 PERSONAL HISTORY OF TOBACCO USE, PRESENTING HAZARDS TO HEALTH: ICD-10-CM

## 2024-08-16 DIAGNOSIS — J44.9 COPD, MODERATE (HCC): Primary | ICD-10-CM

## 2024-08-16 LAB
EXPIRATORY TIME: NORMAL
FEF 25-75% %PRED-PRE: NORMAL
FEF 25-75% PRED: NORMAL
FEF 25-75-PRE: NORMAL
FEV1 %PRED-PRE: NORMAL %
FEV1 PRED: 2.63 L
FEV1/FVC %PRED-PRE: NORMAL
FEV1/FVC PRED: NORMAL
FEV1/FVC: 0.7 %
FEV1: 1.55 L
FVC %PRED-PRE: NORMAL %
FVC PRED: 3.37 L
FVC: 2.22 L
PEF %PRED-PRE: NORMAL
PEF PRED: NORMAL
PEF-PRE: NORMAL

## 2024-08-16 RX ORDER — IPRATROPIUM BROMIDE AND ALBUTEROL SULFATE 2.5; .5 MG/3ML; MG/3ML
3 SOLUTION RESPIRATORY (INHALATION) EVERY 4 HOURS PRN
Qty: 360 ML | Refills: 11 | Status: SHIPPED | OUTPATIENT
Start: 2024-08-16

## 2024-08-16 ASSESSMENT — PULMONARY FUNCTION TESTS
FEV1/FVC: 0.70
FEV1_PREDICTED: 2.63
FVC_PREDICTED: 3.37
FEV1: 1.55
FVC: 2.22

## 2024-09-23 ENCOUNTER — OFFICE VISIT (OUTPATIENT)
Age: 69
End: 2024-09-23
Payer: MEDICARE

## 2024-09-23 VITALS
WEIGHT: 163.8 LBS | HEIGHT: 65 IN | SYSTOLIC BLOOD PRESSURE: 120 MMHG | HEART RATE: 69 BPM | DIASTOLIC BLOOD PRESSURE: 62 MMHG | BODY MASS INDEX: 27.29 KG/M2

## 2024-09-23 DIAGNOSIS — I34.0 MILD MITRAL REGURGITATION BY PRIOR ECHOCARDIOGRAM: ICD-10-CM

## 2024-09-23 DIAGNOSIS — R07.9 CHRONIC CHEST PAIN: ICD-10-CM

## 2024-09-23 DIAGNOSIS — G89.29 CHRONIC CHEST PAIN: ICD-10-CM

## 2024-09-23 DIAGNOSIS — E78.5 HYPERLIPIDEMIA, UNSPECIFIED HYPERLIPIDEMIA TYPE: ICD-10-CM

## 2024-09-23 DIAGNOSIS — R93.1 AGATSTON CORONARY ARTERY CALCIUM SCORE LESS THAN 100: Primary | ICD-10-CM

## 2024-09-23 DIAGNOSIS — I10 HYPERTENSION, UNSPECIFIED TYPE: ICD-10-CM

## 2024-09-23 PROCEDURE — 3074F SYST BP LT 130 MM HG: CPT | Performed by: INTERNAL MEDICINE

## 2024-09-23 PROCEDURE — G8417 CALC BMI ABV UP PARAM F/U: HCPCS | Performed by: INTERNAL MEDICINE

## 2024-09-23 PROCEDURE — G8427 DOCREV CUR MEDS BY ELIG CLIN: HCPCS | Performed by: INTERNAL MEDICINE

## 2024-09-23 PROCEDURE — 99214 OFFICE O/P EST MOD 30 MIN: CPT | Performed by: INTERNAL MEDICINE

## 2024-09-23 PROCEDURE — 4004F PT TOBACCO SCREEN RCVD TLK: CPT | Performed by: INTERNAL MEDICINE

## 2024-09-23 PROCEDURE — 3017F COLORECTAL CA SCREEN DOC REV: CPT | Performed by: INTERNAL MEDICINE

## 2024-09-23 PROCEDURE — 1123F ACP DISCUSS/DSCN MKR DOCD: CPT | Performed by: INTERNAL MEDICINE

## 2024-09-23 PROCEDURE — 3078F DIAST BP <80 MM HG: CPT | Performed by: INTERNAL MEDICINE

## 2024-09-30 ENCOUNTER — TELEPHONE (OUTPATIENT)
Dept: BEHAVIORAL/MENTAL HEALTH CLINIC | Age: 69
End: 2024-09-30

## 2024-09-30 RX ORDER — TRAZODONE HYDROCHLORIDE 100 MG/1
100 TABLET ORAL NIGHTLY
Qty: 90 TABLET | Refills: 0 | Status: SHIPPED | OUTPATIENT
Start: 2024-09-30

## 2024-09-30 RX ORDER — BUSPIRONE HYDROCHLORIDE 10 MG/1
10 TABLET ORAL 3 TIMES DAILY
Qty: 270 TABLET | Refills: 0 | Status: SHIPPED | OUTPATIENT
Start: 2024-09-30

## 2024-09-30 RX ORDER — SERTRALINE HYDROCHLORIDE 100 MG/1
100 TABLET, FILM COATED ORAL EVERY MORNING
Qty: 90 TABLET | Refills: 0 | Status: SHIPPED | OUTPATIENT
Start: 2024-09-30

## 2024-09-30 NOTE — TELEPHONE ENCOUNTER
Patient was rescheduled for 10/24 due to provider being out of office. He is in need of refills to last until that appt.

## 2024-10-24 ENCOUNTER — OFFICE VISIT (OUTPATIENT)
Dept: PRIMARY CARE CLINIC | Facility: CLINIC | Age: 69
End: 2024-10-24

## 2024-10-24 ENCOUNTER — OFFICE VISIT (OUTPATIENT)
Dept: BEHAVIORAL/MENTAL HEALTH CLINIC | Age: 69
End: 2024-10-24
Payer: MEDICARE

## 2024-10-24 VITALS
WEIGHT: 164 LBS | HEIGHT: 65 IN | DIASTOLIC BLOOD PRESSURE: 70 MMHG | OXYGEN SATURATION: 96 % | BODY MASS INDEX: 27.32 KG/M2 | HEART RATE: 70 BPM | TEMPERATURE: 98.4 F | SYSTOLIC BLOOD PRESSURE: 122 MMHG

## 2024-10-24 VITALS
DIASTOLIC BLOOD PRESSURE: 70 MMHG | SYSTOLIC BLOOD PRESSURE: 122 MMHG | HEIGHT: 65 IN | BODY MASS INDEX: 27.32 KG/M2 | OXYGEN SATURATION: 96 % | HEART RATE: 70 BPM | WEIGHT: 164 LBS | TEMPERATURE: 98.4 F

## 2024-10-24 DIAGNOSIS — Z23 ENCOUNTER FOR IMMUNIZATION: ICD-10-CM

## 2024-10-24 DIAGNOSIS — T14.8XXA PUNCTURE WOUND: ICD-10-CM

## 2024-10-24 DIAGNOSIS — G25.81 RESTLESS LEG SYNDROME: ICD-10-CM

## 2024-10-24 DIAGNOSIS — K59.00 CONSTIPATION, UNSPECIFIED CONSTIPATION TYPE: ICD-10-CM

## 2024-10-24 DIAGNOSIS — I10 ESSENTIAL HYPERTENSION: Primary | ICD-10-CM

## 2024-10-24 DIAGNOSIS — E78.1 HYPERTRIGLYCERIDEMIA: ICD-10-CM

## 2024-10-24 DIAGNOSIS — N40.0 BENIGN PROSTATIC HYPERPLASIA WITHOUT LOWER URINARY TRACT SYMPTOMS: ICD-10-CM

## 2024-10-24 DIAGNOSIS — Z12.5 SCREENING FOR PROSTATE CANCER: ICD-10-CM

## 2024-10-24 DIAGNOSIS — F41.1 GENERALIZED ANXIETY DISORDER: ICD-10-CM

## 2024-10-24 DIAGNOSIS — F51.05 INSOMNIA DUE TO MENTAL DISORDER: ICD-10-CM

## 2024-10-24 DIAGNOSIS — F33.41 MAJOR DEPRESSIVE DISORDER, RECURRENT, IN PARTIAL REMISSION (HCC): ICD-10-CM

## 2024-10-24 DIAGNOSIS — K21.9 GASTROESOPHAGEAL REFLUX DISEASE WITHOUT ESOPHAGITIS: ICD-10-CM

## 2024-10-24 DIAGNOSIS — E78.5 DYSLIPIDEMIA: ICD-10-CM

## 2024-10-24 DIAGNOSIS — K58.1 IRRITABLE BOWEL SYNDROME WITH CONSTIPATION: ICD-10-CM

## 2024-10-24 DIAGNOSIS — Z72.0 TOBACCO USE: ICD-10-CM

## 2024-10-24 DIAGNOSIS — Z71.3 DIETARY COUNSELING: ICD-10-CM

## 2024-10-24 DIAGNOSIS — F33.42 RECURRENT MAJOR DEPRESSIVE DISORDER, IN FULL REMISSION (HCC): Primary | ICD-10-CM

## 2024-10-24 DIAGNOSIS — E66.3 OVERWEIGHT: ICD-10-CM

## 2024-10-24 DIAGNOSIS — J44.9 COPD, MODERATE (HCC): ICD-10-CM

## 2024-10-24 PROCEDURE — 3017F COLORECTAL CA SCREEN DOC REV: CPT | Performed by: PSYCHIATRY & NEUROLOGY

## 2024-10-24 PROCEDURE — 99214 OFFICE O/P EST MOD 30 MIN: CPT | Performed by: PSYCHIATRY & NEUROLOGY

## 2024-10-24 PROCEDURE — 4004F PT TOBACCO SCREEN RCVD TLK: CPT | Performed by: PSYCHIATRY & NEUROLOGY

## 2024-10-24 PROCEDURE — 3074F SYST BP LT 130 MM HG: CPT | Performed by: PSYCHIATRY & NEUROLOGY

## 2024-10-24 PROCEDURE — G8482 FLU IMMUNIZE ORDER/ADMIN: HCPCS | Performed by: PSYCHIATRY & NEUROLOGY

## 2024-10-24 PROCEDURE — 1159F MED LIST DOCD IN RCRD: CPT | Performed by: PSYCHIATRY & NEUROLOGY

## 2024-10-24 PROCEDURE — G8417 CALC BMI ABV UP PARAM F/U: HCPCS | Performed by: PSYCHIATRY & NEUROLOGY

## 2024-10-24 PROCEDURE — 3078F DIAST BP <80 MM HG: CPT | Performed by: PSYCHIATRY & NEUROLOGY

## 2024-10-24 PROCEDURE — 1123F ACP DISCUSS/DSCN MKR DOCD: CPT | Performed by: PSYCHIATRY & NEUROLOGY

## 2024-10-24 PROCEDURE — G8427 DOCREV CUR MEDS BY ELIG CLIN: HCPCS | Performed by: PSYCHIATRY & NEUROLOGY

## 2024-10-24 RX ORDER — ATENOLOL 25 MG/1
25 TABLET ORAL DAILY
Qty: 90 TABLET | Refills: 1 | Status: SHIPPED | OUTPATIENT
Start: 2024-10-24

## 2024-10-24 RX ORDER — SERTRALINE HYDROCHLORIDE 100 MG/1
100 TABLET, FILM COATED ORAL EVERY MORNING
Qty: 90 TABLET | Refills: 0 | Status: SHIPPED | OUTPATIENT
Start: 2024-10-24

## 2024-10-24 RX ORDER — TRAZODONE HYDROCHLORIDE 100 MG/1
100 TABLET ORAL NIGHTLY
Qty: 90 TABLET | Refills: 0 | Status: SHIPPED | OUTPATIENT
Start: 2024-10-24

## 2024-10-24 RX ORDER — BUSPIRONE HYDROCHLORIDE 10 MG/1
10 TABLET ORAL 3 TIMES DAILY
Qty: 270 TABLET | Refills: 0 | Status: SHIPPED | OUTPATIENT
Start: 2024-10-24

## 2024-10-24 RX ORDER — TAMSULOSIN HYDROCHLORIDE 0.4 MG/1
0.4 CAPSULE ORAL
Qty: 90 CAPSULE | Refills: 1 | Status: SHIPPED | OUTPATIENT
Start: 2024-10-24

## 2024-10-24 RX ORDER — ROSUVASTATIN CALCIUM 40 MG/1
40 TABLET, COATED ORAL DAILY
Qty: 90 TABLET | Refills: 1 | Status: SHIPPED | OUTPATIENT
Start: 2024-10-24

## 2024-10-24 RX ORDER — OMEPRAZOLE 40 MG/1
40 CAPSULE, DELAYED RELEASE ORAL DAILY
Qty: 90 CAPSULE | Refills: 1 | Status: SHIPPED | OUTPATIENT
Start: 2024-10-24

## 2024-10-24 SDOH — ECONOMIC STABILITY: FOOD INSECURITY: WITHIN THE PAST 12 MONTHS, YOU WORRIED THAT YOUR FOOD WOULD RUN OUT BEFORE YOU GOT MONEY TO BUY MORE.: NEVER TRUE

## 2024-10-24 SDOH — ECONOMIC STABILITY: FOOD INSECURITY: WITHIN THE PAST 12 MONTHS, THE FOOD YOU BOUGHT JUST DIDN'T LAST AND YOU DIDN'T HAVE MONEY TO GET MORE.: NEVER TRUE

## 2024-10-24 SDOH — ECONOMIC STABILITY: INCOME INSECURITY: HOW HARD IS IT FOR YOU TO PAY FOR THE VERY BASICS LIKE FOOD, HOUSING, MEDICAL CARE, AND HEATING?: NOT VERY HARD

## 2024-10-24 ASSESSMENT — PATIENT HEALTH QUESTIONNAIRE - PHQ9
SUM OF ALL RESPONSES TO PHQ QUESTIONS 1-9: 9
9. THOUGHTS THAT YOU WOULD BE BETTER OFF DEAD, OR OF HURTING YOURSELF: NOT AT ALL
1. LITTLE INTEREST OR PLEASURE IN DOING THINGS: NEARLY EVERY DAY
SUM OF ALL RESPONSES TO PHQ QUESTIONS 1-9: 9
4. FEELING TIRED OR HAVING LITTLE ENERGY: NEARLY EVERY DAY
6. FEELING BAD ABOUT YOURSELF - OR THAT YOU ARE A FAILURE OR HAVE LET YOURSELF OR YOUR FAMILY DOWN: NOT AT ALL
SUM OF ALL RESPONSES TO PHQ QUESTIONS 1-9: 9
3. TROUBLE FALLING OR STAYING ASLEEP: NOT AT ALL
5. POOR APPETITE OR OVEREATING: NOT AT ALL
7. TROUBLE CONCENTRATING ON THINGS, SUCH AS READING THE NEWSPAPER OR WATCHING TELEVISION: NOT AT ALL
SUM OF ALL RESPONSES TO PHQ QUESTIONS 1-9: 9
10. IF YOU CHECKED OFF ANY PROBLEMS, HOW DIFFICULT HAVE THESE PROBLEMS MADE IT FOR YOU TO DO YOUR WORK, TAKE CARE OF THINGS AT HOME, OR GET ALONG WITH OTHER PEOPLE: SOMEWHAT DIFFICULT
2. FEELING DOWN, DEPRESSED OR HOPELESS: NEARLY EVERY DAY
8. MOVING OR SPEAKING SO SLOWLY THAT OTHER PEOPLE COULD HAVE NOTICED. OR THE OPPOSITE, BEING SO FIGETY OR RESTLESS THAT YOU HAVE BEEN MOVING AROUND A LOT MORE THAN USUAL: NOT AT ALL
SUM OF ALL RESPONSES TO PHQ9 QUESTIONS 1 & 2: 6

## 2024-10-24 ASSESSMENT — ANXIETY QUESTIONNAIRES
1. FEELING NERVOUS, ANXIOUS, OR ON EDGE: NOT AT ALL
7. FEELING AFRAID AS IF SOMETHING AWFUL MIGHT HAPPEN: NOT AT ALL
GAD7 TOTAL SCORE: 1
2. NOT BEING ABLE TO STOP OR CONTROL WORRYING: NOT AT ALL
6. BECOMING EASILY ANNOYED OR IRRITABLE: NOT AT ALL
3. WORRYING TOO MUCH ABOUT DIFFERENT THINGS: NOT AT ALL
IF YOU CHECKED OFF ANY PROBLEMS ON THIS QUESTIONNAIRE, HOW DIFFICULT HAVE THESE PROBLEMS MADE IT FOR YOU TO DO YOUR WORK, TAKE CARE OF THINGS AT HOME, OR GET ALONG WITH OTHER PEOPLE: NOT DIFFICULT AT ALL
5. BEING SO RESTLESS THAT IT IS HARD TO SIT STILL: SEVERAL DAYS
4. TROUBLE RELAXING: NOT AT ALL

## 2024-10-24 ASSESSMENT — ENCOUNTER SYMPTOMS
CONSTIPATION: 1
EYES NEGATIVE: 1
ALLERGIC/IMMUNOLOGIC NEGATIVE: 1

## 2024-10-24 NOTE — PROGRESS NOTES
Patient:  Ezequiel Mkceon  Age:  69 y.o.  :  1955     SEX:  male MRN:  [id number]     RACE:  /      SEEN:  [x]  PATIENT  []  SPOUSE []  OTHER:                  10/24/2024     9:47 AM 2024    11:50 AM 2024    10:47 AM   PHQ-9    Little interest or pleasure in doing things 3 0 0   Feeling down, depressed, or hopeless 3 0 0   Trouble falling or staying asleep, or sleeping too much 0 0 0   Feeling tired or having little energy 3 1 0   Poor appetite or overeating 0 0 0   Feeling bad about yourself - or that you are a failure or have let yourself or your family down 0 0 0   Trouble concentrating on things, such as reading the newspaper or watching television 0 0 0   Moving or speaking so slowly that other people could have noticed. Or the opposite - being so fidgety or restless that you have been moving around a lot more than usual 0 0 0   Thoughts that you would be better off dead, or of hurting yourself in some way 0 0 0   PHQ-2 Score 6 0 0   PHQ-9 Total Score 9 1 0   If you checked off any problems, how difficult have these problems made it for you to do your work, take care of things at home, or get along with other people? 1 0 0           10/24/2024     9:51 AM 2024    11:51 AM 2024    10:44 AM   VIDA-7 SCREENING   Feeling nervous, anxious, or on edge Not at all Not at all Several days   Not being able to stop or control worrying Not at all Not at all Not at all   Worrying too much about different things Not at all Not at all Not at all   Trouble relaxing Not at all Several days Several days   Being so restless that it is hard to sit still Several days Not at all Not at all   Becoming easily annoyed or irritable Not at all Not at all Not at all   Feeling afraid as if something awful might happen Not at all Not at all Not at all   VIDA-7 Total Score 1 1 2   How difficult have these problems made it for you to do your work, take care of things at home, or get along with
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
## 2024-10-29 ENCOUNTER — HOSPITAL ENCOUNTER (OUTPATIENT)
Dept: GENERAL RADIOLOGY | Age: 69
Discharge: HOME | End: 2024-11-01
Payer: MEDICARE

## 2024-10-29 DIAGNOSIS — J44.9 COPD, MODERATE (HCC): ICD-10-CM

## 2024-10-29 DIAGNOSIS — T14.8XXA PUNCTURE WOUND: ICD-10-CM

## 2024-10-29 DIAGNOSIS — K59.00 CONSTIPATION, UNSPECIFIED CONSTIPATION TYPE: ICD-10-CM

## 2024-10-29 DIAGNOSIS — Z12.5 SCREENING FOR PROSTATE CANCER: ICD-10-CM

## 2024-10-29 DIAGNOSIS — K58.1 IRRITABLE BOWEL SYNDROME WITH CONSTIPATION: ICD-10-CM

## 2024-10-29 DIAGNOSIS — E66.3 OVERWEIGHT: ICD-10-CM

## 2024-10-29 DIAGNOSIS — Z71.3 DIETARY COUNSELING: ICD-10-CM

## 2024-10-29 DIAGNOSIS — N40.0 BENIGN PROSTATIC HYPERPLASIA WITHOUT LOWER URINARY TRACT SYMPTOMS: ICD-10-CM

## 2024-10-29 DIAGNOSIS — E78.5 DYSLIPIDEMIA: ICD-10-CM

## 2024-10-29 DIAGNOSIS — Z72.0 TOBACCO USE: ICD-10-CM

## 2024-10-29 DIAGNOSIS — K21.9 GASTROESOPHAGEAL REFLUX DISEASE WITHOUT ESOPHAGITIS: ICD-10-CM

## 2024-10-29 DIAGNOSIS — G25.81 RESTLESS LEG SYNDROME: ICD-10-CM

## 2024-10-29 DIAGNOSIS — F33.41 MAJOR DEPRESSIVE DISORDER, RECURRENT, IN PARTIAL REMISSION (HCC): ICD-10-CM

## 2024-10-29 DIAGNOSIS — I10 ESSENTIAL HYPERTENSION: ICD-10-CM

## 2024-10-29 DIAGNOSIS — Z23 ENCOUNTER FOR IMMUNIZATION: ICD-10-CM

## 2024-10-29 DIAGNOSIS — E78.1 HYPERTRIGLYCERIDEMIA: ICD-10-CM

## 2024-10-29 LAB
25(OH)D3 SERPL-MCNC: 49 NG/ML (ref 30–100)
ALBUMIN SERPL-MCNC: 4.2 G/DL (ref 3.2–4.6)
ALBUMIN/GLOB SERPL: 1.4 (ref 1–1.9)
ALP SERPL-CCNC: 84 U/L (ref 40–129)
ALT SERPL-CCNC: 33 U/L (ref 8–55)
ANION GAP SERPL CALC-SCNC: 10 MMOL/L (ref 7–16)
AST SERPL-CCNC: 26 U/L (ref 15–37)
BASOPHILS # BLD: 0.1 K/UL (ref 0–0.2)
BASOPHILS NFR BLD: 1 % (ref 0–2)
BILIRUB SERPL-MCNC: 0.7 MG/DL (ref 0–1.2)
BUN SERPL-MCNC: 14 MG/DL (ref 8–23)
CALCIUM SERPL-MCNC: 9.7 MG/DL (ref 8.8–10.2)
CHLORIDE SERPL-SCNC: 102 MMOL/L (ref 98–107)
CHOLEST SERPL-MCNC: 145 MG/DL (ref 0–200)
CO2 SERPL-SCNC: 27 MMOL/L (ref 20–29)
CREAT SERPL-MCNC: 0.95 MG/DL (ref 0.8–1.3)
DIFFERENTIAL METHOD BLD: NORMAL
EOSINOPHIL # BLD: 0.2 K/UL (ref 0–0.8)
EOSINOPHIL NFR BLD: 3 % (ref 0.5–7.8)
ERYTHROCYTE [DISTWIDTH] IN BLOOD BY AUTOMATED COUNT: 12.6 % (ref 11.9–14.6)
GLOBULIN SER CALC-MCNC: 3.1 G/DL (ref 2.3–3.5)
GLUCOSE SERPL-MCNC: 120 MG/DL (ref 70–99)
HCT VFR BLD AUTO: 43.5 % (ref 41.1–50.3)
HDLC SERPL-MCNC: 34 MG/DL (ref 40–60)
HDLC SERPL: 4.3 (ref 0–5)
HGB BLD-MCNC: 15.2 G/DL (ref 13.6–17.2)
IMM GRANULOCYTES # BLD AUTO: 0 K/UL (ref 0–0.5)
IMM GRANULOCYTES NFR BLD AUTO: 0 % (ref 0–5)
LDLC SERPL CALC-MCNC: 60 MG/DL (ref 0–100)
LYMPHOCYTES # BLD: 2.1 K/UL (ref 0.5–4.6)
LYMPHOCYTES NFR BLD: 30 % (ref 13–44)
MCH RBC QN AUTO: 31.7 PG (ref 26.1–32.9)
MCHC RBC AUTO-ENTMCNC: 34.9 G/DL (ref 31.4–35)
MCV RBC AUTO: 90.6 FL (ref 82–102)
MONOCYTES # BLD: 0.5 K/UL (ref 0.1–1.3)
MONOCYTES NFR BLD: 8 % (ref 4–12)
NEUTS SEG # BLD: 4.1 K/UL (ref 1.7–8.2)
NEUTS SEG NFR BLD: 58 % (ref 43–78)
NRBC # BLD: 0 K/UL (ref 0–0.2)
PLATELET # BLD AUTO: 192 K/UL (ref 150–450)
PMV BLD AUTO: 9.9 FL (ref 9.4–12.3)
POTASSIUM SERPL-SCNC: 4.8 MMOL/L (ref 3.5–5.1)
PROT SERPL-MCNC: 7.3 G/DL (ref 6.3–8.2)
PSA SERPL-MCNC: 2.7 NG/ML (ref 0–4)
RBC # BLD AUTO: 4.8 M/UL (ref 4.23–5.6)
SODIUM SERPL-SCNC: 139 MMOL/L (ref 136–145)
TRIGL SERPL-MCNC: 254 MG/DL (ref 0–150)
TSH W FREE THYROID IF ABNORMAL: 1.24 UIU/ML (ref 0.27–4.2)
VLDLC SERPL CALC-MCNC: 51 MG/DL (ref 6–23)
WBC # BLD AUTO: 7 K/UL (ref 4.3–11.1)

## 2024-10-29 PROCEDURE — 73590 X-RAY EXAM OF LOWER LEG: CPT

## 2024-12-31 ENCOUNTER — TELEPHONE (OUTPATIENT)
Dept: PULMONOLOGY | Age: 69
End: 2024-12-31

## 2025-03-14 ENCOUNTER — OFFICE VISIT (OUTPATIENT)
Dept: PULMONOLOGY | Age: 70
End: 2025-03-14
Payer: MEDICARE

## 2025-03-14 VITALS
HEIGHT: 65 IN | TEMPERATURE: 98.1 F | DIASTOLIC BLOOD PRESSURE: 74 MMHG | SYSTOLIC BLOOD PRESSURE: 122 MMHG | HEART RATE: 71 BPM | WEIGHT: 169.2 LBS | OXYGEN SATURATION: 96 % | BODY MASS INDEX: 28.19 KG/M2 | RESPIRATION RATE: 19 BRPM

## 2025-03-14 DIAGNOSIS — R91.8 LUNG NODULES: ICD-10-CM

## 2025-03-14 DIAGNOSIS — J44.9 COPD, MODERATE (HCC): Primary | ICD-10-CM

## 2025-03-14 DIAGNOSIS — F17.200 TOBACCO DEPENDENCE: ICD-10-CM

## 2025-03-14 PROCEDURE — 4004F PT TOBACCO SCREEN RCVD TLK: CPT | Performed by: INTERNAL MEDICINE

## 2025-03-14 PROCEDURE — 3078F DIAST BP <80 MM HG: CPT | Performed by: INTERNAL MEDICINE

## 2025-03-14 PROCEDURE — G8417 CALC BMI ABV UP PARAM F/U: HCPCS | Performed by: INTERNAL MEDICINE

## 2025-03-14 PROCEDURE — 3074F SYST BP LT 130 MM HG: CPT | Performed by: INTERNAL MEDICINE

## 2025-03-14 PROCEDURE — 3017F COLORECTAL CA SCREEN DOC REV: CPT | Performed by: INTERNAL MEDICINE

## 2025-03-14 PROCEDURE — G8427 DOCREV CUR MEDS BY ELIG CLIN: HCPCS | Performed by: INTERNAL MEDICINE

## 2025-03-14 PROCEDURE — 1123F ACP DISCUSS/DSCN MKR DOCD: CPT | Performed by: INTERNAL MEDICINE

## 2025-03-14 PROCEDURE — G2211 COMPLEX E/M VISIT ADD ON: HCPCS | Performed by: INTERNAL MEDICINE

## 2025-03-14 PROCEDURE — 3023F SPIROM DOC REV: CPT | Performed by: INTERNAL MEDICINE

## 2025-03-14 PROCEDURE — 1159F MED LIST DOCD IN RCRD: CPT | Performed by: INTERNAL MEDICINE

## 2025-03-14 PROCEDURE — 99214 OFFICE O/P EST MOD 30 MIN: CPT | Performed by: INTERNAL MEDICINE

## 2025-03-14 PROCEDURE — 1126F AMNT PAIN NOTED NONE PRSNT: CPT | Performed by: INTERNAL MEDICINE

## 2025-03-14 RX ORDER — IPRATROPIUM BROMIDE AND ALBUTEROL SULFATE 2.5; .5 MG/3ML; MG/3ML
3 SOLUTION RESPIRATORY (INHALATION) EVERY 4 HOURS PRN
Qty: 360 ML | Refills: 11 | Status: SHIPPED | OUTPATIENT
Start: 2025-03-14

## 2025-03-14 NOTE — PROGRESS NOTES
Name:  Ezequiel Mckeon  YOB: 1955   MRN: [id number]      Office Visit: 3/14/2025        ASSESSMENT AND PLAN:  (Medical Decision Making)    Impression: This is 67 years old  male with history of moderate COPD and lung nodules.  He was accompanied by his friend and he continues to smoke.  He was counseled about smoking cessation to prevent further lung damage    1.. COPD, moderate (HCC)      Encourage smoking cessation and discussed with him the importance of smoking cessation regarding preserving his lung function..  Will continue on DuoNeb 3 times daily and as needed and we will renew his medication for that.  I suggested to him that he can use that up to 4 times a day if needed.    We will repeat his spirometry next office visit  2. Tobacco dependence  .Advised patient to quit and offered support.  Spent more than 10 minutes counseling patient on smoking cessation, discussing strategies and resources to support the patient in quitting.    3. Lung nodules    Right upper lobe nodule and stable on most recent CT of the chest.  We will schedule follow-up annual low-dose CT of the chest before next office visit    No orders of the defined types were placed in this encounter.    No orders of the defined types were placed in this encounter.          Monet Marcano MD    No specialty comments available.    Total time for encounter on day of encounter was 35 minutes.  This time includes chart prep, review of tests/procedures, review of other provider's notes, documentation and counseling patient regarding disease process and medications.   _________________________________________________________________________    HISTORY OF PRESENT ILLNESS:      Mr. Ezequiel Mckeon is a 69 y.o. male who is seen at [facility] for  COPD    He was accompanied by his friend and he stated that he has multiple medical problems. He had follow-up  low-dose screening CT of the chest which revealed stable right

## 2025-03-24 ENCOUNTER — TELEPHONE (OUTPATIENT)
Dept: BEHAVIORAL/MENTAL HEALTH CLINIC | Age: 70
End: 2025-03-24

## 2025-03-24 RX ORDER — SERTRALINE HYDROCHLORIDE 100 MG/1
100 TABLET, FILM COATED ORAL EVERY MORNING
Qty: 90 TABLET | Refills: 0 | Status: SHIPPED | OUTPATIENT
Start: 2025-03-24

## 2025-03-24 RX ORDER — TRAZODONE HYDROCHLORIDE 100 MG/1
100 TABLET ORAL NIGHTLY
Qty: 90 TABLET | Refills: 0 | Status: SHIPPED | OUTPATIENT
Start: 2025-03-24

## 2025-03-24 RX ORDER — BUSPIRONE HYDROCHLORIDE 10 MG/1
10 TABLET ORAL 3 TIMES DAILY
Qty: 270 TABLET | Refills: 0 | Status: SHIPPED | OUTPATIENT
Start: 2025-03-24

## 2025-03-24 NOTE — TELEPHONE ENCOUNTER
Patient will need refills of all medications prior to visit. Last sent 10/24 for 90 days with no refills. Next appt 4/24

## 2025-03-24 NOTE — TELEPHONE ENCOUNTER
Patient called regarding medication sertraline 100 mg,patient will be running out  before his appointment,please contact patient .    Alisa    Thank you

## 2025-04-23 ENCOUNTER — TELEPHONE (OUTPATIENT)
Dept: PULMONOLOGY | Age: 70
End: 2025-04-23

## 2025-04-23 NOTE — TELEPHONE ENCOUNTER
Patient received his nebulizer medicine and this was ordered wrong.        ipratropium 0.5 mg-albuterol 2.5 mg (DUONEB) 0.5-2.5 (3) MG/3ML SOLN nebulizer solution     Patient gets 90 days supply and he only received 20 days supply

## 2025-04-24 ENCOUNTER — OFFICE VISIT (OUTPATIENT)
Dept: BEHAVIORAL/MENTAL HEALTH CLINIC | Age: 70
End: 2025-04-24
Payer: MEDICARE

## 2025-04-24 ENCOUNTER — OFFICE VISIT (OUTPATIENT)
Dept: PRIMARY CARE CLINIC | Facility: CLINIC | Age: 70
End: 2025-04-24
Payer: MEDICARE

## 2025-04-24 VITALS
HEART RATE: 61 BPM | OXYGEN SATURATION: 95 % | TEMPERATURE: 98.3 F | DIASTOLIC BLOOD PRESSURE: 62 MMHG | BODY MASS INDEX: 27.66 KG/M2 | WEIGHT: 166 LBS | SYSTOLIC BLOOD PRESSURE: 116 MMHG | HEIGHT: 65 IN

## 2025-04-24 VITALS
HEIGHT: 65 IN | SYSTOLIC BLOOD PRESSURE: 116 MMHG | TEMPERATURE: 97.7 F | HEART RATE: 70 BPM | BODY MASS INDEX: 27.66 KG/M2 | WEIGHT: 166 LBS | RESPIRATION RATE: 17 BRPM | DIASTOLIC BLOOD PRESSURE: 63 MMHG | OXYGEN SATURATION: 95 %

## 2025-04-24 DIAGNOSIS — R12 HEART BURN: ICD-10-CM

## 2025-04-24 DIAGNOSIS — E78.5 DYSLIPIDEMIA: ICD-10-CM

## 2025-04-24 DIAGNOSIS — G25.81 RESTLESS LEG SYNDROME: ICD-10-CM

## 2025-04-24 DIAGNOSIS — E78.1 HYPERTRIGLYCERIDEMIA: ICD-10-CM

## 2025-04-24 DIAGNOSIS — Z12.5 SCREENING FOR PROSTATE CANCER: ICD-10-CM

## 2025-04-24 DIAGNOSIS — F41.1 GENERALIZED ANXIETY DISORDER: ICD-10-CM

## 2025-04-24 DIAGNOSIS — E55.9 VITAMIN D DEFICIENCY: ICD-10-CM

## 2025-04-24 DIAGNOSIS — N40.0 BENIGN PROSTATIC HYPERPLASIA WITHOUT LOWER URINARY TRACT SYMPTOMS: ICD-10-CM

## 2025-04-24 DIAGNOSIS — F33.41 MAJOR DEPRESSIVE DISORDER, RECURRENT, IN PARTIAL REMISSION: ICD-10-CM

## 2025-04-24 DIAGNOSIS — F51.05 INSOMNIA DUE TO MENTAL DISORDER: ICD-10-CM

## 2025-04-24 DIAGNOSIS — K21.9 GASTROESOPHAGEAL REFLUX DISEASE WITHOUT ESOPHAGITIS: ICD-10-CM

## 2025-04-24 DIAGNOSIS — Z12.2 SCREENING FOR LUNG CANCER: ICD-10-CM

## 2025-04-24 DIAGNOSIS — K58.1 IRRITABLE BOWEL SYNDROME WITH CONSTIPATION: ICD-10-CM

## 2025-04-24 DIAGNOSIS — E66.3 OVERWEIGHT: ICD-10-CM

## 2025-04-24 DIAGNOSIS — R07.9 INTERMITTENT CHEST PAIN: ICD-10-CM

## 2025-04-24 DIAGNOSIS — Z71.3 DIETARY COUNSELING: ICD-10-CM

## 2025-04-24 DIAGNOSIS — I10 ESSENTIAL HYPERTENSION: ICD-10-CM

## 2025-04-24 DIAGNOSIS — J44.9 COPD, MODERATE (HCC): ICD-10-CM

## 2025-04-24 DIAGNOSIS — F33.42 RECURRENT MAJOR DEPRESSIVE DISORDER, IN FULL REMISSION: Primary | ICD-10-CM

## 2025-04-24 DIAGNOSIS — Z00.00 MEDICARE ANNUAL WELLNESS VISIT, SUBSEQUENT: Primary | ICD-10-CM

## 2025-04-24 PROCEDURE — 1159F MED LIST DOCD IN RCRD: CPT | Performed by: PSYCHIATRY & NEUROLOGY

## 2025-04-24 PROCEDURE — G8427 DOCREV CUR MEDS BY ELIG CLIN: HCPCS | Performed by: FAMILY MEDICINE

## 2025-04-24 PROCEDURE — 3074F SYST BP LT 130 MM HG: CPT | Performed by: FAMILY MEDICINE

## 2025-04-24 PROCEDURE — 4004F PT TOBACCO SCREEN RCVD TLK: CPT | Performed by: PSYCHIATRY & NEUROLOGY

## 2025-04-24 PROCEDURE — G8417 CALC BMI ABV UP PARAM F/U: HCPCS | Performed by: FAMILY MEDICINE

## 2025-04-24 PROCEDURE — 3017F COLORECTAL CA SCREEN DOC REV: CPT | Performed by: PSYCHIATRY & NEUROLOGY

## 2025-04-24 PROCEDURE — 99214 OFFICE O/P EST MOD 30 MIN: CPT | Performed by: FAMILY MEDICINE

## 2025-04-24 PROCEDURE — G2211 COMPLEX E/M VISIT ADD ON: HCPCS | Performed by: FAMILY MEDICINE

## 2025-04-24 PROCEDURE — 3017F COLORECTAL CA SCREEN DOC REV: CPT | Performed by: FAMILY MEDICINE

## 2025-04-24 PROCEDURE — 3074F SYST BP LT 130 MM HG: CPT | Performed by: PSYCHIATRY & NEUROLOGY

## 2025-04-24 PROCEDURE — G0439 PPPS, SUBSEQ VISIT: HCPCS | Performed by: FAMILY MEDICINE

## 2025-04-24 PROCEDURE — 3078F DIAST BP <80 MM HG: CPT | Performed by: PSYCHIATRY & NEUROLOGY

## 2025-04-24 PROCEDURE — 1123F ACP DISCUSS/DSCN MKR DOCD: CPT | Performed by: FAMILY MEDICINE

## 2025-04-24 PROCEDURE — 3078F DIAST BP <80 MM HG: CPT | Performed by: FAMILY MEDICINE

## 2025-04-24 PROCEDURE — G8427 DOCREV CUR MEDS BY ELIG CLIN: HCPCS | Performed by: PSYCHIATRY & NEUROLOGY

## 2025-04-24 PROCEDURE — 1123F ACP DISCUSS/DSCN MKR DOCD: CPT | Performed by: PSYCHIATRY & NEUROLOGY

## 2025-04-24 PROCEDURE — 3023F SPIROM DOC REV: CPT | Performed by: FAMILY MEDICINE

## 2025-04-24 PROCEDURE — 4004F PT TOBACCO SCREEN RCVD TLK: CPT | Performed by: FAMILY MEDICINE

## 2025-04-24 PROCEDURE — 1126F AMNT PAIN NOTED NONE PRSNT: CPT | Performed by: FAMILY MEDICINE

## 2025-04-24 PROCEDURE — 1159F MED LIST DOCD IN RCRD: CPT | Performed by: FAMILY MEDICINE

## 2025-04-24 PROCEDURE — 99214 OFFICE O/P EST MOD 30 MIN: CPT | Performed by: PSYCHIATRY & NEUROLOGY

## 2025-04-24 PROCEDURE — G8417 CALC BMI ABV UP PARAM F/U: HCPCS | Performed by: PSYCHIATRY & NEUROLOGY

## 2025-04-24 RX ORDER — ROSUVASTATIN CALCIUM 40 MG/1
40 TABLET, COATED ORAL DAILY
Qty: 90 TABLET | Refills: 1 | Status: SHIPPED | OUTPATIENT
Start: 2025-04-24

## 2025-04-24 RX ORDER — BUSPIRONE HYDROCHLORIDE 10 MG/1
10 TABLET ORAL 3 TIMES DAILY
Qty: 270 TABLET | Refills: 1 | Status: SHIPPED | OUTPATIENT
Start: 2025-04-24

## 2025-04-24 RX ORDER — FAMOTIDINE 20 MG/1
20 TABLET, FILM COATED ORAL NIGHTLY
Qty: 90 TABLET | Refills: 0 | Status: SHIPPED | OUTPATIENT
Start: 2025-04-24

## 2025-04-24 RX ORDER — OMEPRAZOLE 40 MG/1
40 CAPSULE, DELAYED RELEASE ORAL DAILY
Qty: 90 CAPSULE | Refills: 1 | Status: SHIPPED | OUTPATIENT
Start: 2025-04-24

## 2025-04-24 RX ORDER — TRAZODONE HYDROCHLORIDE 100 MG/1
100 TABLET ORAL NIGHTLY
Qty: 90 TABLET | Refills: 1 | Status: SHIPPED | OUTPATIENT
Start: 2025-04-24

## 2025-04-24 RX ORDER — ATENOLOL 25 MG/1
25 TABLET ORAL DAILY
Qty: 90 TABLET | Refills: 1 | Status: SHIPPED | OUTPATIENT
Start: 2025-04-24

## 2025-04-24 RX ORDER — TAMSULOSIN HYDROCHLORIDE 0.4 MG/1
0.4 CAPSULE ORAL
Qty: 90 CAPSULE | Refills: 1 | Status: SHIPPED | OUTPATIENT
Start: 2025-04-24

## 2025-04-24 RX ORDER — SERTRALINE HYDROCHLORIDE 100 MG/1
100 TABLET, FILM COATED ORAL EVERY MORNING
Qty: 90 TABLET | Refills: 1 | Status: SHIPPED | OUTPATIENT
Start: 2025-04-24

## 2025-04-24 SDOH — ECONOMIC STABILITY: FOOD INSECURITY: WITHIN THE PAST 12 MONTHS, THE FOOD YOU BOUGHT JUST DIDN'T LAST AND YOU DIDN'T HAVE MONEY TO GET MORE.: NEVER TRUE

## 2025-04-24 SDOH — ECONOMIC STABILITY: FOOD INSECURITY: WITHIN THE PAST 12 MONTHS, YOU WORRIED THAT YOUR FOOD WOULD RUN OUT BEFORE YOU GOT MONEY TO BUY MORE.: NEVER TRUE

## 2025-04-24 ASSESSMENT — PATIENT HEALTH QUESTIONNAIRE - PHQ9
SUM OF ALL RESPONSES TO PHQ QUESTIONS 1-9: 0
8. MOVING OR SPEAKING SO SLOWLY THAT OTHER PEOPLE COULD HAVE NOTICED. OR THE OPPOSITE, BEING SO FIGETY OR RESTLESS THAT YOU HAVE BEEN MOVING AROUND A LOT MORE THAN USUAL: NOT AT ALL
10. IF YOU CHECKED OFF ANY PROBLEMS, HOW DIFFICULT HAVE THESE PROBLEMS MADE IT FOR YOU TO DO YOUR WORK, TAKE CARE OF THINGS AT HOME, OR GET ALONG WITH OTHER PEOPLE: NOT DIFFICULT AT ALL
9. THOUGHTS THAT YOU WOULD BE BETTER OFF DEAD, OR OF HURTING YOURSELF: NOT AT ALL
5. POOR APPETITE OR OVEREATING: NOT AT ALL
SUM OF ALL RESPONSES TO PHQ QUESTIONS 1-9: 0
SUM OF ALL RESPONSES TO PHQ QUESTIONS 1-9: 0
3. TROUBLE FALLING OR STAYING ASLEEP: NOT AT ALL
SUM OF ALL RESPONSES TO PHQ QUESTIONS 1-9: 0
4. FEELING TIRED OR HAVING LITTLE ENERGY: NOT AT ALL
SUM OF ALL RESPONSES TO PHQ QUESTIONS 1-9: 0
6. FEELING BAD ABOUT YOURSELF - OR THAT YOU ARE A FAILURE OR HAVE LET YOURSELF OR YOUR FAMILY DOWN: NOT AT ALL
6. FEELING BAD ABOUT YOURSELF - OR THAT YOU ARE A FAILURE OR HAVE LET YOURSELF OR YOUR FAMILY DOWN: NOT AT ALL
4. FEELING TIRED OR HAVING LITTLE ENERGY: NOT AT ALL
SUM OF ALL RESPONSES TO PHQ QUESTIONS 1-9: 0
SUM OF ALL RESPONSES TO PHQ QUESTIONS 1-9: 0
1. LITTLE INTEREST OR PLEASURE IN DOING THINGS: NOT AT ALL
10. IF YOU CHECKED OFF ANY PROBLEMS, HOW DIFFICULT HAVE THESE PROBLEMS MADE IT FOR YOU TO DO YOUR WORK, TAKE CARE OF THINGS AT HOME, OR GET ALONG WITH OTHER PEOPLE: NOT DIFFICULT AT ALL
7. TROUBLE CONCENTRATING ON THINGS, SUCH AS READING THE NEWSPAPER OR WATCHING TELEVISION: NOT AT ALL
5. POOR APPETITE OR OVEREATING: NOT AT ALL
1. LITTLE INTEREST OR PLEASURE IN DOING THINGS: NOT AT ALL
2. FEELING DOWN, DEPRESSED OR HOPELESS: NOT AT ALL
8. MOVING OR SPEAKING SO SLOWLY THAT OTHER PEOPLE COULD HAVE NOTICED. OR THE OPPOSITE, BEING SO FIGETY OR RESTLESS THAT YOU HAVE BEEN MOVING AROUND A LOT MORE THAN USUAL: NOT AT ALL
9. THOUGHTS THAT YOU WOULD BE BETTER OFF DEAD, OR OF HURTING YOURSELF: NOT AT ALL
2. FEELING DOWN, DEPRESSED OR HOPELESS: NOT AT ALL
7. TROUBLE CONCENTRATING ON THINGS, SUCH AS READING THE NEWSPAPER OR WATCHING TELEVISION: NOT AT ALL
SUM OF ALL RESPONSES TO PHQ QUESTIONS 1-9: 0
3. TROUBLE FALLING OR STAYING ASLEEP: NOT AT ALL

## 2025-04-24 ASSESSMENT — ANXIETY QUESTIONNAIRES
7. FEELING AFRAID AS IF SOMETHING AWFUL MIGHT HAPPEN: NEARLY EVERY DAY
GAD7 TOTAL SCORE: 11
IF YOU CHECKED OFF ANY PROBLEMS ON THIS QUESTIONNAIRE, HOW DIFFICULT HAVE THESE PROBLEMS MADE IT FOR YOU TO DO YOUR WORK, TAKE CARE OF THINGS AT HOME, OR GET ALONG WITH OTHER PEOPLE: NOT DIFFICULT AT ALL
1. FEELING NERVOUS, ANXIOUS, OR ON EDGE: MORE THAN HALF THE DAYS
5. BEING SO RESTLESS THAT IT IS HARD TO SIT STILL: SEVERAL DAYS
6. BECOMING EASILY ANNOYED OR IRRITABLE: NOT AT ALL
3. WORRYING TOO MUCH ABOUT DIFFERENT THINGS: NEARLY EVERY DAY
4. TROUBLE RELAXING: SEVERAL DAYS
2. NOT BEING ABLE TO STOP OR CONTROL WORRYING: SEVERAL DAYS

## 2025-04-24 ASSESSMENT — LIFESTYLE VARIABLES
HOW MANY STANDARD DRINKS CONTAINING ALCOHOL DO YOU HAVE ON A TYPICAL DAY: PATIENT DOES NOT DRINK
HOW OFTEN DO YOU HAVE A DRINK CONTAINING ALCOHOL: NEVER

## 2025-04-24 ASSESSMENT — ENCOUNTER SYMPTOMS
GASTROINTESTINAL NEGATIVE: 1
ALLERGIC/IMMUNOLOGIC NEGATIVE: 1
EYES NEGATIVE: 1

## 2025-04-24 NOTE — PROGRESS NOTES
[facility]  [address]  [place] [address]  Office : [phone]  Fax : [phone]      Medicare Annual Wellness Visit    Ezequiel Mckeon is here for Medicare AWV, Follow-up, Hypertension, and Chest Pain (On and off; x several months)    Assessment & Plan   Medicare annual wellness visit, subsequent  -     Lipid Panel; Future  -     TSH reflex to FT4; Future  -     Comprehensive Metabolic Panel; Future  -     Vitamin D 25 Hydroxy; Future  -     CBC with Auto Differential; Future  Essential hypertension  -     atenolol (TENORMIN) 25 MG tablet; Take 1 tablet by mouth daily, Disp-90 tablet, R-1Normal  -     Lipid Panel; Future  -     TSH reflex to FT4; Future  -     Comprehensive Metabolic Panel; Future  -     Vitamin D 25 Hydroxy; Future  -     CBC with Auto Differential; Future  Gastroesophageal reflux disease without esophagitis  -     omeprazole (PRILOSEC) 40 MG delayed release capsule; Take 1 capsule by mouth daily, Disp-90 capsule, R-1Normal  -     Lipid Panel; Future  -     TSH reflex to FT4; Future  -     Comprehensive Metabolic Panel; Future  -     Vitamin D 25 Hydroxy; Future  -     famotidine (PEPCID) 20 MG tablet; Take 1 tablet by mouth nightly, Disp-90 tablet, R-0Normal  -     CBC with Auto Differential; Future  Dyslipidemia  -     rosuvastatin (CRESTOR) 40 MG tablet; Take 1 tablet by mouth daily, Disp-90 tablet, R-1Normal  -     Lipid Panel; Future  -     TSH reflex to FT4; Future  -     Comprehensive Metabolic Panel; Future  -     Vitamin D 25 Hydroxy; Future  -     CBC with Auto Differential; Future  Benign prostatic hyperplasia without lower urinary tract symptoms  -     tamsulosin (FLOMAX) 0.4 MG capsule; Take 1 capsule by mouth nightly, Disp-90 capsule, R-1Normal  -     Lipid Panel; Future  -     TSH reflex to FT4; Future  -     Comprehensive Metabolic Panel; Future  -     Vitamin D 25 Hydroxy; Future  -     CBC with Auto Differential; Future  Hypertriglyceridemia  -

## 2025-04-24 NOTE — PROGRESS NOTES
Patient:  Ezequiel Mckeon  Age:  69 y.o.  :  1955     SEX:  male MRN:  [id number]     RACE:  /      SEEN:  [x]  PATIENT  []  SPOUSE [x]  OTHER: Care provider Chavo                2025    10:41 AM 2025    10:07 AM 10/24/2024     9:47 AM   PHQ-9    Little interest or pleasure in doing things 0 0 3   Feeling down, depressed, or hopeless 0 0 3   Trouble falling or staying asleep, or sleeping too much 0 0 0   Feeling tired or having little energy 0 0 3   Poor appetite or overeating 0 0 0   Feeling bad about yourself - or that you are a failure or have let yourself or your family down 0 0 0   Trouble concentrating on things, such as reading the newspaper or watching television 0 0 0   Moving or speaking so slowly that other people could have noticed. Or the opposite - being so fidgety or restless that you have been moving around a lot more than usual 0 0 0   Thoughts that you would be better off dead, or of hurting yourself in some way 0 0 0   PHQ-2 Score 0 0 6   PHQ-9 Total Score 0 0 9   If you checked off any problems, how difficult have these problems made it for you to do your work, take care of things at home, or get along with other people? 0 0 1           2025    10:38 AM 10/24/2024     9:51 AM 2024    11:51 AM   VIDA-7 SCREENING   Feeling nervous, anxious, or on edge More than half the days Not at all Not at all   Not being able to stop or control worrying Several days Not at all Not at all   Worrying too much about different things Nearly every day Not at all Not at all   Trouble relaxing Several days Not at all Several days   Being so restless that it is hard to sit still Several days Several days Not at all   Becoming easily annoyed or irritable Not at all Not at all Not at all   Feeling afraid as if something awful might happen Nearly every day Not at all Not at all   VIDA-7 Total Score 11 1 1   How difficult have these problems made it for you to do your work,

## 2025-05-01 ENCOUNTER — RESULTS FOLLOW-UP (OUTPATIENT)
Dept: PRIMARY CARE CLINIC | Facility: CLINIC | Age: 70
End: 2025-05-01

## 2025-05-01 ENCOUNTER — HOSPITAL ENCOUNTER (OUTPATIENT)
Dept: CT IMAGING | Age: 70
Discharge: HOME | End: 2025-05-01
Attending: FAMILY MEDICINE
Payer: MEDICARE

## 2025-05-01 DIAGNOSIS — Z12.2 SCREENING FOR LUNG CANCER: ICD-10-CM

## 2025-05-01 DIAGNOSIS — J44.9 COPD, MODERATE (HCC): ICD-10-CM

## 2025-05-01 DIAGNOSIS — R12 HEART BURN: ICD-10-CM

## 2025-05-01 DIAGNOSIS — K58.1 IRRITABLE BOWEL SYNDROME WITH CONSTIPATION: ICD-10-CM

## 2025-05-01 DIAGNOSIS — K21.9 GASTROESOPHAGEAL REFLUX DISEASE WITHOUT ESOPHAGITIS: ICD-10-CM

## 2025-05-01 DIAGNOSIS — E78.1 HYPERTRIGLYCERIDEMIA: ICD-10-CM

## 2025-05-01 DIAGNOSIS — N40.0 BENIGN PROSTATIC HYPERPLASIA WITHOUT LOWER URINARY TRACT SYMPTOMS: ICD-10-CM

## 2025-05-01 DIAGNOSIS — I10 ESSENTIAL HYPERTENSION: ICD-10-CM

## 2025-05-01 DIAGNOSIS — R07.9 INTERMITTENT CHEST PAIN: ICD-10-CM

## 2025-05-01 DIAGNOSIS — E78.5 DYSLIPIDEMIA: ICD-10-CM

## 2025-05-01 DIAGNOSIS — F33.41 MAJOR DEPRESSIVE DISORDER, RECURRENT, IN PARTIAL REMISSION: ICD-10-CM

## 2025-05-01 DIAGNOSIS — E66.3 OVERWEIGHT: ICD-10-CM

## 2025-05-01 DIAGNOSIS — Z00.00 MEDICARE ANNUAL WELLNESS VISIT, SUBSEQUENT: ICD-10-CM

## 2025-05-01 DIAGNOSIS — G25.81 RESTLESS LEG SYNDROME: ICD-10-CM

## 2025-05-01 DIAGNOSIS — E55.9 VITAMIN D DEFICIENCY: ICD-10-CM

## 2025-05-01 DIAGNOSIS — Z12.5 SCREENING FOR PROSTATE CANCER: ICD-10-CM

## 2025-05-01 DIAGNOSIS — Z71.3 DIETARY COUNSELING: ICD-10-CM

## 2025-05-01 LAB
25(OH)D3 SERPL-MCNC: 53.9 NG/ML (ref 30–100)
ALBUMIN SERPL-MCNC: 3.9 G/DL (ref 3.2–4.6)
ALBUMIN/GLOB SERPL: 1.3 (ref 1–1.9)
ALP SERPL-CCNC: 98 U/L (ref 40–129)
ALT SERPL-CCNC: 31 U/L (ref 8–55)
ANION GAP SERPL CALC-SCNC: 10 MMOL/L (ref 7–16)
AST SERPL-CCNC: 24 U/L (ref 15–37)
BASOPHILS # BLD: 0.05 K/UL (ref 0–0.2)
BASOPHILS NFR BLD: 0.7 % (ref 0–2)
BILIRUB SERPL-MCNC: 0.7 MG/DL (ref 0–1.2)
BUN SERPL-MCNC: 16 MG/DL (ref 8–23)
CALCIUM SERPL-MCNC: 9.4 MG/DL (ref 8.8–10.2)
CHLORIDE SERPL-SCNC: 103 MMOL/L (ref 98–107)
CHOLEST SERPL-MCNC: 122 MG/DL (ref 0–200)
CO2 SERPL-SCNC: 26 MMOL/L (ref 20–29)
CREAT SERPL-MCNC: 1.05 MG/DL (ref 0.8–1.3)
DIFFERENTIAL METHOD BLD: ABNORMAL
EOSINOPHIL # BLD: 0.21 K/UL (ref 0–0.8)
EOSINOPHIL NFR BLD: 2.9 % (ref 0.5–7.8)
ERYTHROCYTE [DISTWIDTH] IN BLOOD BY AUTOMATED COUNT: 12.4 % (ref 11.9–14.6)
GLOBULIN SER CALC-MCNC: 3.1 G/DL (ref 2.3–3.5)
GLUCOSE SERPL-MCNC: 150 MG/DL (ref 70–99)
HCT VFR BLD AUTO: 42.2 % (ref 41.1–50.3)
HDLC SERPL-MCNC: 36 MG/DL (ref 40–60)
HDLC SERPL: 3.4 (ref 0–5)
HGB BLD-MCNC: 15.1 G/DL (ref 13.6–17.2)
IMM GRANULOCYTES # BLD AUTO: 0.03 K/UL (ref 0–0.5)
IMM GRANULOCYTES NFR BLD AUTO: 0.4 % (ref 0–5)
LDLC SERPL CALC-MCNC: 60 MG/DL (ref 0–100)
LYMPHOCYTES # BLD: 2.3 K/UL (ref 0.5–4.6)
LYMPHOCYTES NFR BLD: 31.6 % (ref 13–44)
MCH RBC QN AUTO: 31.5 PG (ref 26.1–32.9)
MCHC RBC AUTO-ENTMCNC: 35.8 G/DL (ref 31.4–35)
MCV RBC AUTO: 88.1 FL (ref 82–102)
MONOCYTES # BLD: 0.57 K/UL (ref 0.1–1.3)
MONOCYTES NFR BLD: 7.8 % (ref 4–12)
NEUTS SEG # BLD: 4.13 K/UL (ref 1.7–8.2)
NEUTS SEG NFR BLD: 56.6 % (ref 43–78)
NRBC # BLD: 0 K/UL (ref 0–0.2)
PLATELET # BLD AUTO: 191 K/UL (ref 150–450)
PMV BLD AUTO: 10.1 FL (ref 9.4–12.3)
POTASSIUM SERPL-SCNC: 4.3 MMOL/L (ref 3.5–5.1)
PROT SERPL-MCNC: 7 G/DL (ref 6.3–8.2)
RBC # BLD AUTO: 4.79 M/UL (ref 4.23–5.6)
SODIUM SERPL-SCNC: 139 MMOL/L (ref 136–145)
TRIGL SERPL-MCNC: 134 MG/DL (ref 0–150)
TSH W FREE THYROID IF ABNORMAL: 1.77 UIU/ML (ref 0.27–4.2)
VLDLC SERPL CALC-MCNC: 27 MG/DL (ref 6–23)
WBC # BLD AUTO: 7.3 K/UL (ref 4.3–11.1)

## 2025-05-01 PROCEDURE — 71250 CT THORAX DX C-: CPT

## 2025-05-24 PROBLEM — Z12.5 SCREENING FOR PROSTATE CANCER: Status: RESOLVED | Noted: 2022-12-16 | Resolved: 2025-05-24

## 2025-05-24 PROBLEM — Z00.00 MEDICARE ANNUAL WELLNESS VISIT, SUBSEQUENT: Status: RESOLVED | Noted: 2020-02-11 | Resolved: 2025-05-24

## 2025-07-21 ENCOUNTER — TELEPHONE (OUTPATIENT)
Dept: PRIMARY CARE CLINIC | Facility: CLINIC | Age: 70
End: 2025-07-21

## 2025-07-21 NOTE — TELEPHONE ENCOUNTER
Pt requesting medication refill on Famotidine 20 mg, please call pt back, thank you.  [facility] pharmacy.

## 2025-07-22 DIAGNOSIS — R12 HEART BURN: ICD-10-CM

## 2025-07-22 DIAGNOSIS — K21.9 GASTROESOPHAGEAL REFLUX DISEASE WITHOUT ESOPHAGITIS: ICD-10-CM

## 2025-07-22 RX ORDER — FAMOTIDINE 20 MG/1
20 TABLET, FILM COATED ORAL NIGHTLY
Qty: 90 TABLET | Refills: 0 | Status: SHIPPED | OUTPATIENT
Start: 2025-07-22

## (undated) DEVICE — GUIDEWIRE VASC L260CM DIA0.035IN RAD 3MM J TIP L7CM PTFE

## (undated) DEVICE — CATHETER COR DIAG JUDKINS R 5.0 CRV 6FR 100CM 0 SIDE H

## (undated) DEVICE — CATHETER COR DIAG JUDKINS L 3.5 CRV 6FR 100CM 0 SIDE H

## (undated) DEVICE — GLIDESHEATH SLENDER STAINLESS STEEL KIT: Brand: GLIDESHEATH SLENDER

## (undated) DEVICE — DEVICE COMPR REG 24 CM VASC BND